# Patient Record
Sex: MALE | Race: WHITE | NOT HISPANIC OR LATINO | Employment: PART TIME | URBAN - METROPOLITAN AREA
[De-identification: names, ages, dates, MRNs, and addresses within clinical notes are randomized per-mention and may not be internally consistent; named-entity substitution may affect disease eponyms.]

---

## 2020-05-06 PROBLEM — E03.9 ACQUIRED HYPOTHYROIDISM: Status: ACTIVE | Noted: 2020-05-06

## 2020-05-06 PROBLEM — Z82.49 FAMILY HISTORY OF CORONARY ARTERY DISEASE: Status: ACTIVE | Noted: 2020-05-06

## 2020-05-22 ENCOUNTER — OFFICE VISIT (OUTPATIENT)
Dept: URGENT CARE | Facility: CLINIC | Age: 65
End: 2020-05-22
Payer: MEDICARE

## 2020-05-22 DIAGNOSIS — Z01.818 PREOP EXAMINATION: ICD-10-CM

## 2020-05-22 PROCEDURE — 99201 PR OFFICE/OUTPT VISIT,NEW,LEVL I: CPT | Mod: S$GLB,,, | Performed by: PHYSICIAN ASSISTANT

## 2020-05-22 PROCEDURE — 99201 PR OFFICE/OUTPT VISIT,NEW,LEVL I: ICD-10-PCS | Mod: S$GLB,,, | Performed by: PHYSICIAN ASSISTANT

## 2020-05-22 PROCEDURE — U0003 INFECTIOUS AGENT DETECTION BY NUCLEIC ACID (DNA OR RNA); SEVERE ACUTE RESPIRATORY SYNDROME CORONAVIRUS 2 (SARS-COV-2) (CORONAVIRUS DISEASE [COVID-19]), AMPLIFIED PROBE TECHNIQUE, MAKING USE OF HIGH THROUGHPUT TECHNOLOGIES AS DESCRIBED BY CMS-2020-01-R: HCPCS

## 2020-05-22 NOTE — PATIENT INSTRUCTIONS
Instructions for Patients Awaiting COVID-19 Test Results    You will either be called with your test result or it will be released to the patient portal.  If you have any questions about your test, please visit [email or web address] or call our COVID-19 information line at [phone].    Prevention steps for patients with confirmed or suspected COVID-19     Stay home except to get medical care.   Separate yourself from other people and animals in your home   Call ahead before visiting your doctor.   Wear a facemask.   Cover your coughs and sneezes.   Clean your hands often.   Avoid sharing personal household items.   Clean all high-touch surfaces every day.   Monitor your symptoms. Seek prompt medical attention if your illness is worsening (e.g., difficulty breathing). Before seeking care, call your healthcare provider.   If you have a medical emergency and need to call 911, notify the dispatch personnel that you have, or are being evaluated for COVID-19. If possible, put on a facemask before emergency medical services arrive.   Discontinuing home isolation. Call your provider about guidance to discontinue home isolation.    Recommended precautions for household members, intimate partners, and caregivers in a nonhealthcare setting of a patient with symptomatic laboratory-confirmed COVID-19 or a patient under investigation.  Household members, intimate partners, and caregivers in a nonhealthcare setting may have close contact with a person with symptomatic, laboratory-confirmed COVID-19 or a person under investigation. Close contacts should monitor their health; they should call their healthcare provider right away if they develop symptoms suggestive of COVID-19 (e.g., fever, cough, shortness of breath).    Close contacts should also follow these recommendations:   Make sure that you understand and can help the patient follow their healthcare provider's instructions for medication(s) and care.  You should help the patient with basic needs in the home and provide support for getting groceries, prescriptions, and other personal needs.   Monitor the patient's symptoms. If the patient is getting sicker, call his or her healthcare provider and tell them that the patient has laboratory-confirmed COVID-19. This will help the healthcare provider's office take steps to keep other people in the office or waiting room from getting infected. Ask the healthcare provider to call the local or [facility] health department for additional guidance. If the patient has a medical emergency and you need to call 911, notify the dispatch personnel that the patient has, or is being evaluated for COVID-19.   Household members should stay in another room or be  from the patient as much as possible. Household members should use a separate bedroom and bathroom, if available.   Prohibit visitors who do not have an essential need to be in the home.   Household members should care for any pets in the home. Do not handle pets or other animals while sick.   Make sure that shared spaces in the home have good air flow, such as by an air conditioner or an opened window, weather permitting.   Perform hand hygiene frequently. Wash your hands often with soap and water for at least 20 seconds or use an alcohol-based hand  that contains 60 to 95% alcohol, covering all surfaces of your hands and rubbing them together until they feel dry. Soap and water should be used preferentially if hands are visibly dirty.   Avoid touching your eyes, nose, and mouth with unwashed hands.   The patient should wear a facemask when you are around other people. If the patient is not able to wear a facemask (for example, because it causes trouble breathing), you, as the caregiver should wear a mask when you are in the same room as the patient.   Wear a disposable facemask and gloves when you touch or have contact with the patient's blood, stool,  or body fluids, such as saliva, sputum, nasal mucus, vomit, urine.  o Throw out disposable facemasks and gloves after using them. Do not reuse.  o When removing personal protective equipment, first remove and dispose of gloves. Then, immediately clean your hands with soap and water or alcohol-based hand . Next, remove and dispose of facemask, and immediately clean your hands again with soap and water or alcohol-based hand .   Avoid sharing household items with the patient. You should not share dishes, drinking glasses, cups, eating utensils, towels, bedding, or other items. After the patient uses these items, you should wash them thoroughly (see below Wash laundry thoroughly).   Clean all high-touch surfaces, such as counters, tabletops, doorknobs, bathroom fixtures, toilets, phones, keyboards, tablets, and bedside tables, every day. Also, clean any surfaces that may have blood, stool, or body fluids on them.   Use a household cleaning spray or wipe, according to the label instructions. Labels contain instructions for safe and effective use of the cleaning product including precautions you should take when applying the product, such as wearing gloves and making sure you have good ventilation during use of the product.   Wash laundry thoroughly.  o Immediately remove and wash clothes or bedding that have blood, stool, or body fluids on them.  o Wear disposable gloves while handling soiled items and keep soiled items away from your body. Clean your hands (with soap and water or an alcohol-based hand ) immediately after removing your gloves.  o Read and follow directions on labels of laundry or clothing items and detergent. In general, using a normal laundry detergent according to washing machine instructions and dry thoroughly using the warmest temperatures recommended on the clothing label.   Place all used disposable gloves, facemasks, and other contaminated items in a lined  container before disposing of them with other household waste. Clean your hands (with soap and water or an alcohol-based hand ) immediately after handling these items. Soap and water should be used preferentially if hands are visibly dirty.   Discuss any additional questions with your state or local health department or healthcare provider. Check available hours when contacting your local health department.    For more information see CDC link below.      https://www.cdc.gov/coronavirus/2019-ncov/hcp/guidance-prevent-spread.html#precautions        Sources:  CDC, [place] Department of Health and [facility]        If not allergic,take tylenol (acetominophen) for fever control, chills, or body aches every 4 hours. Do not exceed 4000 mg/ day.If not allergic, take Motrin (Ibuprofen) every 4 hours for fever, chills, pain or inflammation. Do not exceed 2400 mg/day. You can alternate taking tylenol and motrin.    If you were prescribed a narcotic medication, do not drive or operate heavy equipment or machinery while taking these medications.  You must understand that you've received an Urgent Care treatment only and that you may be released before all your medical problems are known or treated. You, the patient, will arrange for follow up care as instructed.  Follow up with your PCP or specialty clinic as directed in the next 1-2 weeks if not improved or as needed.  You can call [phone] to schedule an appointment with the appropriate provider.  If your condition worsens we recommend that you receive another evaluation at the emergency room immediately or contact your primary medical clinics after hours call service to discuss your concerns.    Please return here or go to the Emergency Department for any concerns or worsening of condition.    If you have been referred to another provider and wish to call to check on the status of your referral, please call [facility] at [phone]

## 2020-05-23 LAB — SARS-COV-2 RNA RESP QL NAA+PROBE: NOT DETECTED

## 2020-05-26 PROBLEM — R94.39 ABNORMAL NUCLEAR STRESS TEST: Status: ACTIVE | Noted: 2020-05-26

## 2020-07-13 PROBLEM — I25.10 CORONARY ARTERY DISEASE INVOLVING NATIVE CORONARY ARTERY OF NATIVE HEART WITHOUT ANGINA PECTORIS: Status: ACTIVE | Noted: 2020-07-13

## 2020-07-29 ENCOUNTER — OFFICE VISIT (OUTPATIENT)
Dept: NEUROSURGERY | Facility: CLINIC | Age: 65
End: 2020-07-29
Payer: MEDICARE

## 2020-07-29 VITALS
HEART RATE: 82 BPM | BODY MASS INDEX: 37.09 KG/M2 | WEIGHT: 236.31 LBS | SYSTOLIC BLOOD PRESSURE: 158 MMHG | HEIGHT: 67 IN | DIASTOLIC BLOOD PRESSURE: 88 MMHG

## 2020-07-29 DIAGNOSIS — G99.2 STENOSIS OF CERVICAL SPINE WITH MYELOPATHY: ICD-10-CM

## 2020-07-29 DIAGNOSIS — M54.16 LUMBAR RADICULOPATHY: Primary | ICD-10-CM

## 2020-07-29 DIAGNOSIS — M48.02 STENOSIS OF CERVICAL SPINE WITH MYELOPATHY: ICD-10-CM

## 2020-07-29 DIAGNOSIS — Z12.89 ENCOUNTER FOR SCREENING FOR MALIGNANT NEOPLASM OF OTHER SITES: ICD-10-CM

## 2020-07-29 DIAGNOSIS — M21.371 FOOT DROP, RIGHT: ICD-10-CM

## 2020-07-29 PROCEDURE — 99999 PR PBB SHADOW E&M-EST. PATIENT-LVL III: CPT | Mod: PBBFAC,,, | Performed by: STUDENT IN AN ORGANIZED HEALTH CARE EDUCATION/TRAINING PROGRAM

## 2020-07-29 PROCEDURE — 99205 OFFICE O/P NEW HI 60 MIN: CPT | Mod: S$GLB,ICN,, | Performed by: STUDENT IN AN ORGANIZED HEALTH CARE EDUCATION/TRAINING PROGRAM

## 2020-07-29 PROCEDURE — 3008F BODY MASS INDEX DOCD: CPT | Mod: CPTII,S$GLB,ICN, | Performed by: STUDENT IN AN ORGANIZED HEALTH CARE EDUCATION/TRAINING PROGRAM

## 2020-07-29 PROCEDURE — 99205 PR OFFICE/OUTPT VISIT, NEW, LEVL V, 60-74 MIN: ICD-10-PCS | Mod: S$GLB,ICN,, | Performed by: STUDENT IN AN ORGANIZED HEALTH CARE EDUCATION/TRAINING PROGRAM

## 2020-07-29 PROCEDURE — 1101F PT FALLS ASSESS-DOCD LE1/YR: CPT | Mod: CPTII,S$GLB,ICN, | Performed by: STUDENT IN AN ORGANIZED HEALTH CARE EDUCATION/TRAINING PROGRAM

## 2020-07-29 PROCEDURE — 99999 PR PBB SHADOW E&M-EST. PATIENT-LVL III: ICD-10-PCS | Mod: PBBFAC,,, | Performed by: STUDENT IN AN ORGANIZED HEALTH CARE EDUCATION/TRAINING PROGRAM

## 2020-07-29 PROCEDURE — 1101F PR PT FALLS ASSESS DOC 0-1 FALLS W/OUT INJ PAST YR: ICD-10-PCS | Mod: CPTII,S$GLB,ICN, | Performed by: STUDENT IN AN ORGANIZED HEALTH CARE EDUCATION/TRAINING PROGRAM

## 2020-07-29 PROCEDURE — 3008F PR BODY MASS INDEX (BMI) DOCUMENTED: ICD-10-PCS | Mod: CPTII,S$GLB,ICN, | Performed by: STUDENT IN AN ORGANIZED HEALTH CARE EDUCATION/TRAINING PROGRAM
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
## 2020-07-29 NOTE — LETTER
July 29, 2020      Tori Escalona MD  [address] [address] [facility] [address]           [facility] Neurosurgery  [address] [place] [address]  Phone: [phone]  Fax: [phone]          Patient: Ap Chi   MR Number: [id number]   YOB: 1955   Date of Visit: 7/29/2020       Dear Dr. Tori Escalona:    Thank you for referring Ap Chi to me for evaluation. Attached you will find relevant portions of my assessment and plan of care.    If you have questions, please do not hesitate to call me. I look forward to following Ap Chi along with you.    Sincerely,    Hussain Willis MD    Enclosure  CC:  No Recipients    If you would like to receive this communication electronically, please contact [email or web address] or [phone] to request more information on [employer] access.    For providers and/or their staff who would like to refer a patient to [facility], please contact us through our one-stop-shop provider referral line, [facility] [place], at [phone].    If you feel you have received this communication in error or would no longer like to receive these types of communications, please e-mail [email or web address]

## 2020-07-31 ENCOUNTER — TELEPHONE (OUTPATIENT)
Dept: NEUROSURGERY | Facility: CLINIC | Age: 65
End: 2020-07-31

## 2020-07-31 DIAGNOSIS — Z13.9 SCREENING PROCEDURE: ICD-10-CM

## 2020-07-31 NOTE — TELEPHONE ENCOUNTER
Please set the patient up for a midline epidural steroid injection interlaminar at L4/5 to cover the L4/5 and L3/4 levels.

## 2020-07-31 NOTE — TELEPHONE ENCOUNTER
Patient was seen by Dr Willis. He recommends L3-4 L4-5 ANN-MARIE. He would also like him scheduled for a consult. Please contact patient to schedule.

## 2020-08-07 NOTE — TELEPHONE ENCOUNTER
Spoke with patient about scheduling procedure. He is prescribed aspirin by Dr. Mcnair. We will obtain this clearance and contact patient to scheduling once we have this.

## 2020-08-11 ENCOUNTER — HOSPITAL ENCOUNTER (OUTPATIENT)
Dept: RADIOLOGY | Facility: HOSPITAL | Age: 65
Discharge: HOME | End: 2020-08-11
Attending: STUDENT IN AN ORGANIZED HEALTH CARE EDUCATION/TRAINING PROGRAM
Payer: MEDICARE

## 2020-08-11 DIAGNOSIS — M54.16 LUMBAR RADICULOPATHY: ICD-10-CM

## 2020-08-11 DIAGNOSIS — Z12.89 ENCOUNTER FOR SCREENING FOR MALIGNANT NEOPLASM OF OTHER SITES: ICD-10-CM

## 2020-08-11 DIAGNOSIS — M21.371 FOOT DROP, RIGHT: ICD-10-CM

## 2020-08-11 PROCEDURE — 72146 MRI CHEST SPINE W/O DYE: CPT | Mod: TC,PO

## 2020-08-11 PROCEDURE — 72120 X-RAY BEND ONLY L-S SPINE: CPT | Mod: TC,FY,PO

## 2020-08-11 PROCEDURE — 72120 X-RAY BEND ONLY L-S SPINE: CPT | Mod: 26,,, | Performed by: RADIOLOGY

## 2020-08-11 PROCEDURE — 72146 MRI CHEST SPINE W/O DYE: CPT | Mod: 26,,, | Performed by: RADIOLOGY

## 2020-08-11 PROCEDURE — 72141 MRI NECK SPINE W/O DYE: CPT | Mod: TC,PO

## 2020-08-11 PROCEDURE — 70551 MRI BRAIN STEM W/O DYE: CPT | Mod: 26,,, | Performed by: RADIOLOGY

## 2020-08-11 PROCEDURE — 72131 CT LUMBAR SPINE W/O DYE: CPT | Mod: 26,,, | Performed by: RADIOLOGY

## 2020-08-11 PROCEDURE — 72100 XR LUMBAR SPINE AP AND LAT WITH FLEX/EXT: ICD-10-PCS | Mod: 26,,, | Performed by: RADIOLOGY

## 2020-08-11 PROCEDURE — 72131 CT LUMBAR SPINE WITHOUT CONTRAST: ICD-10-PCS | Mod: 26,,, | Performed by: RADIOLOGY

## 2020-08-11 PROCEDURE — 72141 MRI CERVICAL SPINE WITHOUT CONTRAST: ICD-10-PCS | Mod: 26,,, | Performed by: RADIOLOGY

## 2020-08-11 PROCEDURE — 70551 MRI BRAIN STEM W/O DYE: CPT | Mod: TC,PO

## 2020-08-11 PROCEDURE — 72146 MRI THORACIC SPINE WITHOUT CONTRAST: ICD-10-PCS | Mod: 26,,, | Performed by: RADIOLOGY

## 2020-08-11 PROCEDURE — 72100 X-RAY EXAM L-S SPINE 2/3 VWS: CPT | Mod: 26,,, | Performed by: RADIOLOGY

## 2020-08-11 PROCEDURE — 72120 XR LUMBAR SPINE AP AND LAT WITH FLEX/EXT: ICD-10-PCS | Mod: 26,,, | Performed by: RADIOLOGY

## 2020-08-11 PROCEDURE — 72131 CT LUMBAR SPINE W/O DYE: CPT | Mod: TC,PO

## 2020-08-11 PROCEDURE — 70551 MRI BRAIN WITHOUT CONTRAST: ICD-10-PCS | Mod: 26,,, | Performed by: RADIOLOGY

## 2020-08-11 PROCEDURE — 72141 MRI NECK SPINE W/O DYE: CPT | Mod: 26,,, | Performed by: RADIOLOGY

## 2020-08-12 ENCOUNTER — CLINICAL SUPPORT (OUTPATIENT)
Dept: REHABILITATION | Facility: HOSPITAL | Age: 65
End: 2020-08-12
Attending: STUDENT IN AN ORGANIZED HEALTH CARE EDUCATION/TRAINING PROGRAM
Payer: MEDICARE

## 2020-08-12 DIAGNOSIS — R29.898 WEAKNESS OF RIGHT LOWER EXTREMITY: ICD-10-CM

## 2020-08-12 DIAGNOSIS — M21.371 FOOT DROP, RIGHT: ICD-10-CM

## 2020-08-12 DIAGNOSIS — M54.16 LUMBAR RADICULOPATHY: ICD-10-CM

## 2020-08-12 DIAGNOSIS — M54.41 CHRONIC RIGHT-SIDED LOW BACK PAIN WITH RIGHT-SIDED SCIATICA: ICD-10-CM

## 2020-08-12 DIAGNOSIS — R26.2 DIFFICULTY WALKING: ICD-10-CM

## 2020-08-12 DIAGNOSIS — Z12.89 ENCOUNTER FOR SCREENING FOR MALIGNANT NEOPLASM OF OTHER SITES: ICD-10-CM

## 2020-08-12 DIAGNOSIS — G89.29 CHRONIC RIGHT-SIDED LOW BACK PAIN WITH RIGHT-SIDED SCIATICA: ICD-10-CM

## 2020-08-12 PROCEDURE — 97161 PT EVAL LOW COMPLEX 20 MIN: CPT | Mod: PO | Performed by: PHYSICAL THERAPIST

## 2020-08-12 PROCEDURE — 97110 THERAPEUTIC EXERCISES: CPT | Mod: PO | Performed by: PHYSICAL THERAPIST
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
## 2020-08-20 ENCOUNTER — OFFICE VISIT (OUTPATIENT)
Dept: PHYSICAL MEDICINE AND REHAB | Facility: CLINIC | Age: 65
End: 2020-08-20
Payer: MEDICARE

## 2020-08-20 VITALS — HEIGHT: 67 IN | WEIGHT: 236.31 LBS | BODY MASS INDEX: 37.09 KG/M2

## 2020-08-20 DIAGNOSIS — M54.16 LUMBAR RADICULITIS: ICD-10-CM

## 2020-08-20 DIAGNOSIS — M79.605 PAIN IN BOTH LOWER EXTREMITIES: ICD-10-CM

## 2020-08-20 DIAGNOSIS — M79.604 PAIN IN BOTH LOWER EXTREMITIES: ICD-10-CM

## 2020-08-20 DIAGNOSIS — R29.898 LEG WEAKNESS, BILATERAL: ICD-10-CM

## 2020-08-20 PROCEDURE — 95886 MUSC TEST DONE W/N TEST COMP: CPT | Mod: S$GLB,,, | Performed by: PHYSICAL MEDICINE & REHABILITATION

## 2020-08-20 PROCEDURE — 95886 PR EMG COMPLETE, W/ NERVE CONDUCTION STUDIES, 5+ MUSCLES: ICD-10-PCS | Mod: S$GLB,,, | Performed by: PHYSICAL MEDICINE & REHABILITATION

## 2020-08-20 PROCEDURE — 99499 UNLISTED E&M SERVICE: CPT | Mod: GC,S$GLB,, | Performed by: PHYSICAL MEDICINE & REHABILITATION

## 2020-08-20 PROCEDURE — 95909 PR NERVE CONDUCTION STUDY; 5-6 STUDIES: ICD-10-PCS | Mod: S$GLB,,, | Performed by: PHYSICAL MEDICINE & REHABILITATION

## 2020-08-20 PROCEDURE — 95909 NRV CNDJ TST 5-6 STUDIES: CPT | Mod: S$GLB,,, | Performed by: PHYSICAL MEDICINE & REHABILITATION

## 2020-08-20 PROCEDURE — 99499 NO LOS: ICD-10-PCS | Mod: GC,S$GLB,, | Performed by: PHYSICAL MEDICINE & REHABILITATION

## 2020-08-20 PROCEDURE — 99999 PR PBB SHADOW E&M-EST. PATIENT-LVL III: CPT | Mod: PBBFAC,,, | Performed by: PHYSICAL MEDICINE & REHABILITATION

## 2020-08-20 PROCEDURE — 99999 PR PBB SHADOW E&M-EST. PATIENT-LVL III: ICD-10-PCS | Mod: PBBFAC,,, | Performed by: PHYSICAL MEDICINE & REHABILITATION

## 2020-08-20 NOTE — LETTER
August 20, 2020      Hussain Willis MD  [address] [facility] [address] [place] [address]           [place] [facility]  [address] [place] [address]  Phone: [phone]  Fax: [phone]          Patient: Ap Chi   MR Number: [id number]   YOB: 1955   Date of Visit: 8/20/2020       Dear Dr. Hussain Willis:    Thank you for referring Ap Chi to me for evaluation. Attached you will find relevant portions of my assessment and plan of care.    If you have questions, please do not hesitate to call me. I look forward to following Ap Chi along with you.    Sincerely,    Gamal Nair MD    Enclosure  CC:  No Recipients    If you would like to receive this communication electronically, please contact [email or web address] or [phone] to request more information on [employer] access.    For providers and/or their staff who would like to refer a patient to [facility], please contact us through our one-stop-shop provider referral line, [facility], at [phone].    If you feel you have received this communication in error or would no longer like to receive these types of communications, please e-mail [email or web address]

## 2020-08-20 NOTE — PROGRESS NOTES
[facility]  [address]  [place] [address]             Full Name: Ap Chi Gender: Male  Patient ID: [id number] YOB: 1955  History: 65 y.o. male reports low back pain with paresthesias, pain and weakness in bilateral legs.      Visit Date: 8/19/2020 10:51  Age: 65 Years 3 Months Old      Sensory NCS      Nerve / Sites Rec. Site Onset Lat Peak Lat NP Amp PP Amp Segments Distance Velocity     ms ms µV µV  cm m/s   L Sural - Ankle (Calf)      Calf Ankle 3.23 3.70 9.3 2.9 Calf - Ankle 14 43      2 Ankle 3.28 3.70 8.2 3.4      R Sural - Ankle (Calf)      Calf Ankle 3.33 3.75 6.3 0.00 Calf - Ankle 14 42      2 Ankle 3.39 3.85 6.4 2.5          Motor NCS      Nerve / Sites Muscle Latency Amplitude Amp % Duration Segments Distance Lat Diff Velocity     ms mV % ms  cm ms m/s   L Peroneal - EDB      Ankle EDB 7.97 0.5 100 6.51 Ankle - EDB 8        Fib head EDB 14.27 1.6 331 6.56 Fib head - Ankle 28 6.30 44   R Peroneal - EDB      Ankle EDB 8.39 0.4 100 5.42 Ankle - EDB 8        Fib head EDB 17.24 0.4 93.5 5.05 Fib head - Ankle 27 8.85 30   R Tibial - AH      Ankle AH 5.52 4.4 100 5.99 Ankle - AH 8        Pop fossa AH 13.59 4.0 92.1  Pop fossa - Ankle 39 8.07 48       EMG         EMG Summary Table     Spontaneous MUAP Recruitment   Muscle IA Fib PSW Fasc H.F. Amp Dur. PPP Pattern   L. Deltoid N None None None None N N N N   R. Vastus medialis N None None None None N N N N   R. Tibialis anterior N None None None None N N N N   R. Peroneus longus N None None None None N N N N   R. Gastrocnemius (Medial head) N None None None None N N N N   R. Gluteus medius N None None None None N N N N   R. Gluteus david N None None None None       R. Lumbar paraspinals N None None None None N N N N   L. Vastus medialis N None None None None N N N N   L. Tibialis anterior N None None None None N N N N   L. Peroneus longus N None None None None N N N N   L. Gastrocnemius (Medial head) N None None None None  N N N N   L. Lumbar paraspinals 1+ None 1+ None None       L. Gluteus david N None None None None           Summary    The motor conduction test was performed on 3 nerve(s). The results were normal in 1 nerve(s): R Tibial - AH. Results outside the specified normal range were found in 2 nerve(s), as follows:   In the L Peroneal - EDB study  o the take off latency result was increased for Ankle stimulation  o the peak amplitude result was reduced for Ankle stimulation   In the R Peroneal - EDB study  o the take off latency result was increased for Ankle stimulation  o the peak amplitude result was reduced for Ankle stimulation  o the take off velocity result was reduced for Fib head - Ankle segment    The sensory conduction test was normal in all 2 of the tested nerves: L Sural - Ankle (Calf), R Sural - Ankle (Calf).    The needle EMG examination was performed in 14 muscles. It was normal in 13 muscle(s): L. Deltoid, R. Vastus medialis, R. Tibialis anterior, R. Peroneus longus, R. Gastrocnemius (Medial head), R. Gluteus medius, R. Gluteus david, R. Lumbar paraspinals, L. Vastus medialis, L. Tibialis anterior, L. Peroneus longus, L. Gastrocnemius (Medial head), L. Gluteus david. The study was abnormal in 1 muscle(s), with the following distribution:   The L. Lumbar paraspinals had abnormal spontaneous activity.      Electrodiagnostic Impression:  1. Abnormalities on nerve conduction studies were isolated to prolonged latencies and reduced amplitudes of the bilateral peroneal motor studies recording at the extensor digitorum brevis muscles.  Of note, no visible contraction of the EDB muscle was observed, indicative of atrophy.  Atrophy of this muscle could be secondary to L5 radiculopathy, peroneal nerve neuropathy, peripheral polyneuropathy, or disuse/musculoskeletal causes.  Todays nerve conduction studies do not support a diagnosis of peripheral polyneuropathy.  Furthermore, needle EMG examination of the  bilateral lower extremities failed to show any abnormal findings in muscles supplied by the L5 myotome or peroneal nerves.  As such, this finding may be most likely due to disuse/musculoskeletal causes, however a history of (but not active) L5 radiculopathy cannot be completely excluded.    2. Abnormal needle EMG findings were isolated to sampling of the left lumbar paraspinal musculature in the form of very brief abnormal spontaneous activity.  This finding, in isolation, is somewhat nonspecific.  This could be indicative of early radiculopathy, or lumbosacral radiculitis.  Clinical correlation required.    Summary:  1. Bilateral extensor digitorum brevis muscle wasting, see above.  2. Mild abnormal needle EMG findings isolated to the left lumbar paraspinal musculature, see above.    Plan:  Today's test results will be sent to his referring physician, Dr. Willis, for further review and direction in his treatment.    Thank you very much for the referral. Please call if you have any questions regarding this study or the report.       -------------------------------  Gamal Nair M.D.

## 2020-08-21 ENCOUNTER — CLINICAL SUPPORT (OUTPATIENT)
Dept: REHABILITATION | Facility: HOSPITAL | Age: 65
End: 2020-08-21
Attending: STUDENT IN AN ORGANIZED HEALTH CARE EDUCATION/TRAINING PROGRAM
Payer: MEDICARE

## 2020-08-21 DIAGNOSIS — M54.41 CHRONIC RIGHT-SIDED LOW BACK PAIN WITH RIGHT-SIDED SCIATICA: ICD-10-CM

## 2020-08-21 DIAGNOSIS — G89.29 CHRONIC RIGHT-SIDED LOW BACK PAIN WITH RIGHT-SIDED SCIATICA: ICD-10-CM

## 2020-08-21 DIAGNOSIS — R29.898 WEAKNESS OF RIGHT LOWER EXTREMITY: ICD-10-CM

## 2020-08-21 DIAGNOSIS — R26.2 DIFFICULTY WALKING: ICD-10-CM

## 2020-08-21 PROCEDURE — 97110 THERAPEUTIC EXERCISES: CPT | Mod: PO | Performed by: PHYSICAL THERAPIST

## 2020-08-21 NOTE — PATIENT INSTRUCTIONS
HAMSTRING STRETCH WITH TOWEL    While lying down on your back, hook a towel or strap under  your foot and draw up your leg until a stretch is felt along the backside of your leg. Hold 30 seconds, repeat 30 seconds ea.    Keep your knee in a straightened position during the stretch.

## 2020-08-21 NOTE — PROGRESS NOTES
[facility]      Name: Ap Chi  Clinic Number: [id number]    Therapy Diagnosis:   Encounter Diagnoses   Name Primary?    Chronic right-sided low back pain with right-sided sciatica     Weakness of right lower extremity     Difficulty walking      Physician: Hussain Willis MD    Visit Date: 2020    Physician Orders: PT Eval and Treat Healthy Back program  Medical Diagnosis from Referral: lumbar radiculopathy; right drop foot  Evaluation Date: 2020  Authorization Period Expiration: 20  Plan of Care Expiration: 20  Reassessment Due: 10th visit  Visit # / Visits authorized:       Time In: 9:05  Time Out: 9:55AM  Total Billable Time: 50 minutes  Precautions: Standard       Pattern of pain determined: Pattern 2       Subjective   Ap reports no change in sx. Had not started on HEP.  Did a lot in pool yesterday which seems to help. Remaining active helps.    Patient reports tolerating previous visit without adverse effect  Patient reports their pain to be 0/10 on a 0-10 scale with 0 being no pain and 10 being the worst pain imaginable.  Pain Location: across low back, right posterior thigh and calf, occasional right ant thigh         Work and leisure: Occupation: construction work,   Leisure: work around house, currently building deck        Pt goals: strengthen legs and back, stay mobile       Objective     Baseline Isometric Testing on Med X equipment: Testing administered by PT  Date of testin20  ROM 3-42 deg   Max Peak Torque 139    Min Peak Torque 74    Flex/Ext Ratio 1.9:1   % below normative data 52         Outcomes:  Initial score: 46%   Visit 5 score:  Goal:      Treatment    Pt was instructed in and performed the following:     Ap received therapeutic exercises to develop/improved posture, cardiovascular endurance, muscular endurance, lumbar/cervical ROM, strength and muscular endurance for 50 minutes including the  following exercises:      Med x dynamic exercise   Single KTC 5x 5 sec  KTC 5x 5 sec  Post pelvic tilt x20  Hamstring stretch 3x 30 sec ea  Seated trunk flexion 5x 5 sec      Peripheral muscle strengthening which included 1 set of 15-20 repetitions at a slow, controlled 10-13 second per rep pace focused on strengthening supporting musculature for improved body mechanics and functional mobility.  Pt and therapist focused on proper form during treatment to ensure optimal strengthening of each targeted muscle group.  Machines were utilized including torso rotation, leg extension, leg curl, chest press, upright row. Tricep extension, bicep curl, leg press, and hip abduction added visit 3  [facility] 8/21/2020   Visit Number 2   VAS Pain Rating 0   Recumbent Bike Seat Pos. 8   Time 10   Flexion in Lying 10   Flexion in Sitting 5   Lumbar Extension Seat Pad -   Femur Restraint -   Top Dead Center -   Counterweight -   Lumbar Flexion -   Lumbar Extension -   Lumbar Peak Torque -   Min Torque -   Test Percent Below Normative Data -   Lumbar Weight 70   Repetitions 15   Rating of Perceived Exertion 3   Ice - Z Lie (in min.) -           Home Exercises Provided and Patient Education Provided     Education provided:   - instructed in HEP, use of ice if pain flares up and use of lumbar support    Written Home Exercises Provided: yes.  Exercises were reviewed and Alberto Chi was able to demonstrate them prior to the end of the session.  Alberto Chi demonstrated good  understanding of the education provided.     See EMR under Patient Instructions for exercises provided 8/21/2020.          Assessment     Initiated strengthening on MEDX and LE peripheral machines. Level of perceived exertion higher on LE machines secondary to right LE weakness, but no significant difficulty with left LE. Also instructed in Pattern 2 flexion exercises and hamstring stretching.    Patient is making good progress towards established  goals.  Pt will continue to benefit from skilled outpatient physical therapy to address the deficits stated in the impairment chart, provide pt/family education and to maximize pt's level of independence in the home and community environment.     Anticipated Barriers for therapy: none  Pt's spiritual, cultural and educational needs considered and pt agreeable to plan of care and goals as stated below:             Goals:   Short term goals:  6 weeks or 10 visits   1.  Pt will demonstrate increased lumbar ROM by at least 3 degrees from the initial ROM value with improvements noted in functional ROM and ability to perform ADLs.  2.  Pt will demonstrate increased MedX average isometric strength value  by 20% from initial test resulting in improved ability to perform bending, lifting, and carrying activities safely, confidently.     3.  Patient report a reduction in worst pain score by 1-2 points for improved tolerance for rising from sitting and getting out of bed.  4.  Pt able to perform HEP correctly with minimal cueing or supervision from therapist to encourage independent management of symptoms.         Long term goals: 10 weeks or 20 visits   1. Pt will demonstrate increased lumbar ROM by at least 6 degrees from initial ROM value, resulting in improved ability to perform functional fwd bending while standing and sitting.   2. Pt will demonstrate increased MedX average isometric strength value  by 30% from initial test resulting in improved ability to perform bending, lifting, and carrying activities safely, confidently.  3. Pt to demonstrate ability to independently control and reduce their pain through posture positioning and mechanical movements throughout a typical day.  4.  Pt will demonstrate reduced pain and improved functional outcomes as reported on the FOTO by reaching a limitation score of < or = 35% or less in order to demonstrate subjective improvement in pt's condition.    5. Pt will demonstrate  independence with the HEP at discharge  6.  Pt will demonstrate improved LE and core strength and flexibility in order to keep mobile (patient goal)        Plan   Continue with established Plan of Care towards established PT goals.

## 2020-08-24 PROBLEM — M48.061 DEGENERATIVE LUMBAR SPINAL STENOSIS: Status: ACTIVE | Noted: 2020-08-24

## 2020-08-24 PROBLEM — M48.02 DEGENERATIVE CERVICAL SPINAL STENOSIS: Status: ACTIVE | Noted: 2020-08-24

## 2020-08-26 ENCOUNTER — CLINICAL SUPPORT (OUTPATIENT)
Dept: REHABILITATION | Facility: HOSPITAL | Age: 65
End: 2020-08-26
Attending: STUDENT IN AN ORGANIZED HEALTH CARE EDUCATION/TRAINING PROGRAM
Payer: MEDICARE

## 2020-08-26 DIAGNOSIS — R26.2 DIFFICULTY WALKING: ICD-10-CM

## 2020-08-26 DIAGNOSIS — G89.29 CHRONIC RIGHT-SIDED LOW BACK PAIN WITH RIGHT-SIDED SCIATICA: ICD-10-CM

## 2020-08-26 DIAGNOSIS — R29.898 WEAKNESS OF RIGHT LOWER EXTREMITY: ICD-10-CM

## 2020-08-26 DIAGNOSIS — M48.02 STENOSIS OF CERVICAL SPINE WITH MYELOPATHY: Primary | ICD-10-CM

## 2020-08-26 DIAGNOSIS — G99.2 STENOSIS OF CERVICAL SPINE WITH MYELOPATHY: Primary | ICD-10-CM

## 2020-08-26 DIAGNOSIS — M54.41 CHRONIC RIGHT-SIDED LOW BACK PAIN WITH RIGHT-SIDED SCIATICA: ICD-10-CM

## 2020-08-26 PROCEDURE — 97110 THERAPEUTIC EXERCISES: CPT | Mod: PO | Performed by: PHYSICAL THERAPIST

## 2020-08-26 NOTE — PROGRESS NOTES
[facility]      Name: Ap Chi  Clinic Number: [id number]    Therapy Diagnosis:   Encounter Diagnoses   Name Primary?    Chronic right-sided low back pain with right-sided sciatica     Weakness of right lower extremity     Difficulty walking      Physician: Hussain Willis MD    Visit Date: 2020    Physician Orders: PT Eval and Treat Healthy Back program  Medical Diagnosis from Referral: lumbar radiculopathy; right drop foot  Evaluation Date: 2020  Authorization Period Expiration: 20  Plan of Care Expiration: 20  Reassessment Due: 10th visit  Visit # / Visits authorized: 3/ 6      Time In: 9:05AM  Time Out: 9:55AM  Total Billable Time: 50 minutes  Precautions: Standard       Pattern of pain determined: Pattern 2       Subjective   Ap reports he stopped Gabapenten on Monday because of foggy-headedness and switched to Celebrex.Back pain on awakening was 3/10. It had been 6/10. He feels that has helped. After last visit he had increased cervical pain which he relates to torso rotation exercise. Right knee also hurt which he relates to hamstring stretch.    Patient reports tolerating previous visit without adverse effect  Patient reports their pain to be 3/10 on a 0-10 scale with 0 being no pain and 10 being the worst pain imaginable.  Pain Location: across low back, right posterior thigh and calf, occasional right ant thigh         Work and leisure: Occupation: construction work,   Leisure: work around house, currently building deck        Pt goals: strengthen legs and back, stay mobile       Objective     Baseline Isometric Testing on Med X equipment: Testing administered by PT  Date of testin20  ROM 3-42 deg   Max Peak Torque 139    Min Peak Torque 74    Flex/Ext Ratio 1.9:1   % below normative data 52         Outcomes:  Initial score: 46%   Visit 5 score:  Goal:      Treatment    Pt was instructed in and performed the following:      Ap received therapeutic exercises to develop/improved posture, cardiovascular endurance, muscular endurance, lumbar/cervical ROM, strength and muscular endurance for 50 minutes including the following exercises:      Med x dynamic exercise   Single KTC 5x 5 sec  KTC 5x 5 sec  Post pelvic tilt x20  Hamstring stretch 3x 30 sec ea seated  Seated trunk flexion 5x 5 sec      Peripheral muscle strengthening which included 1 set of 15-20 repetitions at a slow, controlled 10-13 second per rep pace focused on strengthening supporting musculature for improved body mechanics and functional mobility.  Pt and therapist focused on proper form during treatment to ensure optimal strengthening of each targeted muscle group.  Machines were utilized including torso rotation, leg extension, leg curl, chest press, upright row. Tricep extension, bicep curl, leg press, and hip abduction added visit 3  [facility] 8/26/2020   Visit Number 3   VAS Pain Rating 3   Recumbent Bike Seat Pos. 0   Time 10   Flexion in Lying 10   Flexion in Sitting 5   Lumbar Extension Seat Pad -   Femur Restraint -   Top Dead Center -   Counterweight -   Lumbar Flexion -   Lumbar Extension -   Lumbar Peak Torque -   Min Torque -   Test Percent Below Normative Data -   Lumbar Weight 70   Repetitions 20   Rating of Perceived Exertion 3   Ice - Z Lie (in min.) -           Home Exercises Provided and Patient Education Provided     Education provided:   - instructed in HEP, use of ice if pain flares up and use of lumbar support    Written Home Exercises Provided: yes.  Exercises were reviewed and Alberto was able to demonstrate them prior to the end of the session.  Alberto demonstrated good  understanding of the education provided.     See EMR under Patient Instructions for exercises provided 8/21/2020.          Assessment     Increased cervical pain following last visit. Had not initiated any UE exercise as of that visit. Had some pain with torso  rotation so held on that today. Plan to resume with decreased resistance and decreased ROM next visit. Will also decrease resistance with leg press secondary to right medial knee pain.    Patient is making good progress towards established goals.  Pt will continue to benefit from skilled outpatient physical therapy to address the deficits stated in the impairment chart, provide pt/family education and to maximize pt's level of independence in the home and community environment.     Anticipated Barriers for therapy: none  Pt's spiritual, cultural and educational needs considered and pt agreeable to plan of care and goals as stated below:             Goals:   Short term goals:  6 weeks or 10 visits   1.  Pt will demonstrate increased lumbar ROM by at least 3 degrees from the initial ROM value with improvements noted in functional ROM and ability to perform ADLs.  2.  Pt will demonstrate increased MedX average isometric strength value  by 20% from initial test resulting in improved ability to perform bending, lifting, and carrying activities safely, confidently.     3.  Patient report a reduction in worst pain score by 1-2 points for improved tolerance for rising from sitting and getting out of bed.  4.  Pt able to perform HEP correctly with minimal cueing or supervision from therapist to encourage independent management of symptoms.         Long term goals: 10 weeks or 20 visits   1. Pt will demonstrate increased lumbar ROM by at least 6 degrees from initial ROM value, resulting in improved ability to perform functional fwd bending while standing and sitting.   2. Pt will demonstrate increased MedX average isometric strength value  by 30% from initial test resulting in improved ability to perform bending, lifting, and carrying activities safely, confidently.  3. Pt to demonstrate ability to independently control and reduce their pain through posture positioning and mechanical movements throughout a typical day.  4.   Pt will demonstrate reduced pain and improved functional outcomes as reported on the FOTO by reaching a limitation score of < or = 35% or less in order to demonstrate subjective improvement in pt's condition.    5. Pt will demonstrate independence with the HEP at discharge  6.  Pt will demonstrate improved LE and core strength and flexibility in order to keep mobile (patient goal)        Plan   Continue with established Plan of Care towards established PT goals.

## 2020-08-27 ENCOUNTER — OFFICE VISIT (OUTPATIENT)
Dept: NEUROSURGERY | Facility: CLINIC | Age: 65
End: 2020-08-27
Payer: MEDICARE

## 2020-08-27 ENCOUNTER — HOSPITAL ENCOUNTER (OUTPATIENT)
Dept: RADIOLOGY | Facility: HOSPITAL | Age: 65
Discharge: HOME | End: 2020-08-27
Attending: STUDENT IN AN ORGANIZED HEALTH CARE EDUCATION/TRAINING PROGRAM
Payer: MEDICARE

## 2020-08-27 VITALS
SYSTOLIC BLOOD PRESSURE: 133 MMHG | WEIGHT: 235.69 LBS | BODY MASS INDEX: 36.99 KG/M2 | DIASTOLIC BLOOD PRESSURE: 79 MMHG | TEMPERATURE: 97 F | HEIGHT: 67 IN | HEART RATE: 82 BPM

## 2020-08-27 DIAGNOSIS — G99.2 STENOSIS OF CERVICAL SPINE WITH MYELOPATHY: ICD-10-CM

## 2020-08-27 DIAGNOSIS — G95.89 MYELOMALACIA OF CERVICAL CORD: Primary | ICD-10-CM

## 2020-08-27 DIAGNOSIS — M48.02 STENOSIS OF CERVICAL SPINE WITH MYELOPATHY: ICD-10-CM

## 2020-08-27 PROCEDURE — 1101F PR PT FALLS ASSESS DOC 0-1 FALLS W/OUT INJ PAST YR: ICD-10-PCS | Mod: CPTII,S$GLB,, | Performed by: STUDENT IN AN ORGANIZED HEALTH CARE EDUCATION/TRAINING PROGRAM

## 2020-08-27 PROCEDURE — 3008F BODY MASS INDEX DOCD: CPT | Mod: CPTII,S$GLB,, | Performed by: STUDENT IN AN ORGANIZED HEALTH CARE EDUCATION/TRAINING PROGRAM

## 2020-08-27 PROCEDURE — 72050 X-RAY EXAM NECK SPINE 4/5VWS: CPT | Mod: TC,FY,PO

## 2020-08-27 PROCEDURE — 72050 X-RAY EXAM NECK SPINE 4/5VWS: CPT | Mod: 26,,, | Performed by: RADIOLOGY

## 2020-08-27 PROCEDURE — 99999 PR PBB SHADOW E&M-EST. PATIENT-LVL III: CPT | Mod: PBBFAC,,, | Performed by: STUDENT IN AN ORGANIZED HEALTH CARE EDUCATION/TRAINING PROGRAM

## 2020-08-27 PROCEDURE — 99215 PR OFFICE/OUTPT VISIT, EST, LEVL V, 40-54 MIN: ICD-10-PCS | Mod: S$GLB,,, | Performed by: STUDENT IN AN ORGANIZED HEALTH CARE EDUCATION/TRAINING PROGRAM

## 2020-08-27 PROCEDURE — 72050 XR CERVICAL SPINE AP LAT WITH FLEX EXTEN: ICD-10-PCS | Mod: 26,,, | Performed by: RADIOLOGY

## 2020-08-27 PROCEDURE — 99999 PR PBB SHADOW E&M-EST. PATIENT-LVL III: ICD-10-PCS | Mod: PBBFAC,,, | Performed by: STUDENT IN AN ORGANIZED HEALTH CARE EDUCATION/TRAINING PROGRAM

## 2020-08-27 PROCEDURE — 99215 OFFICE O/P EST HI 40 MIN: CPT | Mod: S$GLB,,, | Performed by: STUDENT IN AN ORGANIZED HEALTH CARE EDUCATION/TRAINING PROGRAM

## 2020-08-27 PROCEDURE — 1101F PT FALLS ASSESS-DOCD LE1/YR: CPT | Mod: CPTII,S$GLB,, | Performed by: STUDENT IN AN ORGANIZED HEALTH CARE EDUCATION/TRAINING PROGRAM

## 2020-08-27 PROCEDURE — 3008F PR BODY MASS INDEX (BMI) DOCUMENTED: ICD-10-PCS | Mod: CPTII,S$GLB,, | Performed by: STUDENT IN AN ORGANIZED HEALTH CARE EDUCATION/TRAINING PROGRAM
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
## 2020-08-31 ENCOUNTER — TELEPHONE (OUTPATIENT)
Dept: PAIN MEDICINE | Facility: CLINIC | Age: 65
End: 2020-08-31

## 2020-08-31 NOTE — TELEPHONE ENCOUNTER
Left patient a voicemail to call back and schedule the lumbar injection. Cardio clearance was received by Dr. Mcnair.

## 2020-09-04 ENCOUNTER — CLINICAL SUPPORT (OUTPATIENT)
Dept: REHABILITATION | Facility: HOSPITAL | Age: 65
End: 2020-09-04
Attending: STUDENT IN AN ORGANIZED HEALTH CARE EDUCATION/TRAINING PROGRAM
Payer: MEDICARE

## 2020-09-04 DIAGNOSIS — R29.898 WEAKNESS OF RIGHT LOWER EXTREMITY: ICD-10-CM

## 2020-09-04 DIAGNOSIS — G89.29 CHRONIC RIGHT-SIDED LOW BACK PAIN WITH RIGHT-SIDED SCIATICA: ICD-10-CM

## 2020-09-04 DIAGNOSIS — M54.41 CHRONIC RIGHT-SIDED LOW BACK PAIN WITH RIGHT-SIDED SCIATICA: ICD-10-CM

## 2020-09-04 DIAGNOSIS — R26.2 DIFFICULTY WALKING: ICD-10-CM

## 2020-09-04 PROCEDURE — 97110 THERAPEUTIC EXERCISES: CPT | Mod: PO | Performed by: PHYSICAL THERAPIST

## 2020-09-04 NOTE — PROGRESS NOTES
[facility]      Name: Ap Chi  Clinic Number: [id number]    Therapy Diagnosis:   Encounter Diagnoses   Name Primary?    Chronic right-sided low back pain with right-sided sciatica     Weakness of right lower extremity     Difficulty walking      Physician: Hussain Willis MD    Visit Date: 2020    Physician Orders: PT Eval and Treat Healthy Back program  Medical Diagnosis from Referral: lumbar radiculopathy; right drop foot  Evaluation Date: 2020  Authorization Period Expiration: 20  Plan of Care Expiration: 20  Reassessment Due: 10th visit  Visit # / Visits authorized:       Time In: 9:10AM  Time Out: 9:50AM  Total Billable Time: 40 minutes  Precautions: Standard       Pattern of pain determined: Pattern 2       Subjective   Ap reports he is having neck fusion on 9/15/20, possibly C4-T1 for myelomalacia and cord compression. He is cancelling all further appointments at this time.     Patient reports tolerating previous visit without adverse effect  Patient reports their pain to be 2-3/10 on a 0-10 scale with 0 being no pain and 10 being the worst pain imaginable.  Pain Location: across low back, right posterior thigh and calf, occasional right ant thigh         Work and leisure: Occupation: construction work,   Leisure: work around house, currently building deck        Pt goals: strengthen legs and back, stay mobile       Objective     Baseline Isometric Testing on Med X equipment: Testing administered by PT  Date of testin20  ROM 3-42 deg   Max Peak Torque 139    Min Peak Torque 74    Flex/Ext Ratio 1.9:1   % below normative data 52         Outcomes:  Initial score: 46%   Visit 5 score:  Goal:      Treatment    Pt was instructed in and performed the following:     Ap received therapeutic exercises to develop/improved posture, cardiovascular endurance, muscular endurance, lumbar/cervical ROM, strength and muscular  endurance for 40 minutes including the following exercises:      Med x dynamic exercise- not performed   Stationary bike 10 min, seat 8  Single KTC 5x 5 sec  KTC 5x 5 sec  Post pelvic tilt x20  Hamstring stretch 3x 30 sec ea seated  Seated trunk flexion 5x 5 sec  LTR 5x 5 sec  HS curls with red ball x20    Not performed:  Peripheral muscle strengthening which included 1 set of 15-20 repetitions at a slow, controlled 10-13 second per rep pace focused on strengthening supporting musculature for improved body mechanics and functional mobility.  Pt and therapist focused on proper form during treatment to ensure optimal strengthening of each targeted muscle group.  Machines were utilized including torso rotation, leg extension, leg curl, chest press, upright row. Tricep extension, bicep curl, leg press, and hip abduction added visit 3        Home Exercises Provided and Patient Education Provided     Education provided:   - instructed in HEP, use of ice if pain flares up and use of lumbar support    Written Home Exercises Provided: yes.  Exercises were reviewed and Alberto was able to demonstrate them prior to the end of the session.  Alberto demonstrated good  understanding of the education provided.     See EMR under Patient Instructions for exercises provided 8/21/2020.          Assessment     Pt having cervical surgery on 9/15/20 and several appointments next week. Did not continue with Healthy Back program at this time, as pt no longer appropriate candidate. Reviewed lumbar and LE stretches and light exercise that would be safe for him to continue at this time. He reports being able to do some exercise in pool with decreased pain. Cautioned pt not to do pool exercise after surgery until cleared by surgeon.    Patient is making good progress towards established goals.  Pt will continue to benefit from skilled outpatient physical therapy to address the deficits stated in the impairment chart, provide pt/family  education and to maximize pt's level of independence in the home and community environment.     Anticipated Barriers for therapy: none  Pt's spiritual, cultural and educational needs considered and pt agreeable to plan of care and goals as stated below:             Goals: Not met  Short term goals:  6 weeks or 10 visits   1.  Pt will demonstrate increased lumbar ROM by at least 3 degrees from the initial ROM value with improvements noted in functional ROM and ability to perform ADLs.  2.  Pt will demonstrate increased MedX average isometric strength value  by 20% from initial test resulting in improved ability to perform bending, lifting, and carrying activities safely, confidently.     3.  Patient report a reduction in worst pain score by 1-2 points for improved tolerance for rising from sitting and getting out of bed.  4.  Pt able to perform HEP correctly with minimal cueing or supervision from therapist to encourage independent management of symptoms.         Long term goals: 10 weeks or 20 visits   1. Pt will demonstrate increased lumbar ROM by at least 6 degrees from initial ROM value, resulting in improved ability to perform functional fwd bending while standing and sitting.   2. Pt will demonstrate increased MedX average isometric strength value  by 30% from initial test resulting in improved ability to perform bending, lifting, and carrying activities safely, confidently.  3. Pt to demonstrate ability to independently control and reduce their pain through posture positioning and mechanical movements throughout a typical day.  4.  Pt will demonstrate reduced pain and improved functional outcomes as reported on the FOTO by reaching a limitation score of < or = 35% or less in order to demonstrate subjective improvement in pt's condition.    5. Pt will demonstrate independence with the HEP at discharge  6.  Pt will demonstrate improved LE and core strength and flexibility in order to keep mobile (patient  goal)        Plan    Plan to discharge from physical therapy, specifically Healthy Back program, at this time secondary to upcoming cervical surgery.

## 2020-09-08 ENCOUNTER — TELEPHONE (OUTPATIENT)
Dept: NEUROSURGERY | Facility: CLINIC | Age: 65
End: 2020-09-08

## 2020-09-08 DIAGNOSIS — Z98.1 S/P CERVICAL SPINAL FUSION: Primary | ICD-10-CM

## 2020-09-08 DIAGNOSIS — R79.1 ABNORMAL COAGULATION PROFILE: ICD-10-CM

## 2020-09-08 DIAGNOSIS — G95.89 MYELOMALACIA OF CERVICAL CORD: Primary | ICD-10-CM

## 2020-09-08 RX ORDER — SODIUM CHLORIDE, SODIUM LACTATE, POTASSIUM CHLORIDE, CALCIUM CHLORIDE 600; 310; 30; 20 MG/100ML; MG/100ML; MG/100ML; MG/100ML
INJECTION, SOLUTION INTRAVENOUS CONTINUOUS
Status: CANCELLED | OUTPATIENT
Start: 2020-09-08

## 2020-09-08 RX ORDER — LIDOCAINE HYDROCHLORIDE 10 MG/ML
1 INJECTION, SOLUTION EPIDURAL; INFILTRATION; INTRACAUDAL; PERINEURAL ONCE
Status: CANCELLED | OUTPATIENT
Start: 2020-09-08 | End: 2020-09-08

## 2020-09-08 NOTE — TELEPHONE ENCOUNTER
Mr Chi will be having surgery on 9/15/20 with Dr. Willis, Neurosurgeon, at [facility]. We are reaching out to obtain a surgical clearance from Dr. Mcnair to ensure the safety of our patient. The surgery is a cervical fusion and will be under general anesthesia. This procedure typically lasts approximately 4 hours. We request that the patient hold all anticoagulant/antiinflammatory medications for 5-7 days prior to surgery. Please let us know if our office can be of further assistance.     Thank you,     Gayle Rios LPN  P: [phone]  F: [phone]

## 2020-09-15 ENCOUNTER — DOCUMENTATION ONLY (OUTPATIENT)
Dept: REHABILITATION | Facility: HOSPITAL | Age: 65
End: 2020-09-15

## 2020-09-15 DIAGNOSIS — G89.29 CHRONIC RIGHT-SIDED LOW BACK PAIN WITH RIGHT-SIDED SCIATICA: Primary | ICD-10-CM

## 2020-09-15 DIAGNOSIS — R29.898 WEAKNESS OF RIGHT LOWER EXTREMITY: ICD-10-CM

## 2020-09-15 DIAGNOSIS — R26.2 DIFFICULTY WALKING: ICD-10-CM

## 2020-09-15 DIAGNOSIS — M54.41 CHRONIC RIGHT-SIDED LOW BACK PAIN WITH RIGHT-SIDED SCIATICA: Primary | ICD-10-CM

## 2020-09-15 PROBLEM — G95.89 MYELOMALACIA OF CERVICAL CORD: Status: ACTIVE | Noted: 2020-09-15

## 2020-09-15 NOTE — PROGRESS NOTES
Outpatient Therapy Discharge Summary     Name: Ap Chi  [facility] Number: [id number]    Therapy Diagnosis:   Encounter Diagnoses   Name Primary?    Chronic right-sided low back pain with right-sided sciatica Yes    Weakness of right lower extremity     Difficulty walking         Physician: Hussain Willis MD        Physician Orders: PT Eval and Treat Healthy Back program  Medical Diagnosis from Referral: lumbar radiculopathy; right drop foot  Evaluation Date: 8/12/2020        Date of Last visit: 9/4/20  Total Visits Received: 4      Assessment    Goals: NOT MET  Short term goals:  6 weeks or 10 visits   1.  Pt will demonstrate increased lumbar ROM by at least 3 degrees from the initial ROM value with improvements noted in functional ROM and ability to perform ADLs.  2.  Pt will demonstrate increased MedX average isometric strength value  by 20% from initial test resulting in improved ability to perform bending, lifting, and carrying activities safely, confidently.     3.  Patient report a reduction in worst pain score by 1-2 points for improved tolerance for rising from sitting and getting out of bed.  4.  Pt able to perform HEP correctly with minimal cueing or supervision from therapist to encourage independent management of symptoms.         Long term goals: 10 weeks or 20 visits   1. Pt will demonstrate increased lumbar ROM by at least 6 degrees from initial ROM value, resulting in improved ability to perform functional fwd bending while standing and sitting.   2. Pt will demonstrate increased MedX average isometric strength value  by 30% from initial test resulting in improved ability to perform bending, lifting, and carrying activities safely, confidently.  3. Pt to demonstrate ability to independently control and reduce their pain through posture positioning and mechanical movements throughout a typical day.  4.  Pt will demonstrate reduced pain and improved functional outcomes as reported on the  FOTO by reaching a limitation score of < or = 35% or less in order to demonstrate subjective improvement in pt's condition.    5. Pt will demonstrate independence with the HEP at discharge  6.  Pt will demonstrate improved LE and core strength and flexibility in order to keep mobile (patient goal)         Discharge reason: Other:   Plan to discharge from physical therapy, specifically Healthy Back program, at this time secondary to upcoming cervical surgery.         Plan   This patient is discharged from Physical Therapy

## 2020-09-25 ENCOUNTER — CLINICAL SUPPORT (OUTPATIENT)
Dept: NEUROSURGERY | Facility: CLINIC | Age: 65
End: 2020-09-25
Payer: MEDICARE

## 2020-09-25 PROCEDURE — 99999 PR PBB SHADOW E&M-EST. PATIENT-LVL I: ICD-10-PCS | Mod: PBBFAC,,,

## 2020-09-25 PROCEDURE — 99999 PR PBB SHADOW E&M-EST. PATIENT-LVL I: CPT | Mod: PBBFAC,,,

## 2020-09-25 RX ORDER — OXYCODONE AND ACETAMINOPHEN 7.5; 325 MG/1; MG/1
1 TABLET ORAL EVERY 6 HOURS PRN
Qty: 28 TABLET | Refills: 0 | Status: SHIPPED | OUTPATIENT
Start: 2020-09-25 | End: 2020-12-18

## 2020-09-25 RX ORDER — TIZANIDINE 4 MG/1
4 TABLET ORAL EVERY 8 HOURS PRN
Qty: 60 TABLET | Refills: 0 | Status: SHIPPED | OUTPATIENT
Start: 2020-09-25 | End: 2020-10-05

## 2020-09-25 NOTE — PROGRESS NOTES
Pt is 10 days s/p ACDF with Dr. Willis. No s/s of infection. Incision cleaned with chloraprep and steri strips removed with no issue. Incision is warm, dry, intact. Pt reports post-operative pain level < pre-operative state. Pt is requesting medication refill today. Pt re-educated on narcotics policy. Educated patient on weight lifting status, bending/lifting/twisting, and to call with any changes or questions. Pt aware of imaging and f/u appt with provider. No further questions.

## 2020-10-29 ENCOUNTER — OFFICE VISIT (OUTPATIENT)
Dept: NEUROSURGERY | Facility: CLINIC | Age: 65
End: 2020-10-29
Payer: MEDICARE

## 2020-10-29 ENCOUNTER — HOSPITAL ENCOUNTER (OUTPATIENT)
Dept: RADIOLOGY | Facility: HOSPITAL | Age: 65
Discharge: HOME | End: 2020-10-29
Attending: STUDENT IN AN ORGANIZED HEALTH CARE EDUCATION/TRAINING PROGRAM
Payer: MEDICARE

## 2020-10-29 VITALS
WEIGHT: 235 LBS | HEART RATE: 79 BPM | HEIGHT: 68 IN | DIASTOLIC BLOOD PRESSURE: 82 MMHG | BODY MASS INDEX: 35.61 KG/M2 | SYSTOLIC BLOOD PRESSURE: 125 MMHG

## 2020-10-29 DIAGNOSIS — G99.2 STENOSIS OF CERVICAL SPINE WITH MYELOPATHY: Primary | ICD-10-CM

## 2020-10-29 DIAGNOSIS — Z98.1 S/P CERVICAL SPINAL FUSION: ICD-10-CM

## 2020-10-29 DIAGNOSIS — M48.02 STENOSIS OF CERVICAL SPINE WITH MYELOPATHY: Primary | ICD-10-CM

## 2020-10-29 PROCEDURE — 99024 PR POST-OP FOLLOW-UP VISIT: ICD-10-PCS | Mod: S$GLB,,, | Performed by: STUDENT IN AN ORGANIZED HEALTH CARE EDUCATION/TRAINING PROGRAM

## 2020-10-29 PROCEDURE — 99999 PR PBB SHADOW E&M-EST. PATIENT-LVL III: ICD-10-PCS | Mod: PBBFAC,,, | Performed by: STUDENT IN AN ORGANIZED HEALTH CARE EDUCATION/TRAINING PROGRAM

## 2020-10-29 PROCEDURE — 99999 PR PBB SHADOW E&M-EST. PATIENT-LVL III: CPT | Mod: PBBFAC,,, | Performed by: STUDENT IN AN ORGANIZED HEALTH CARE EDUCATION/TRAINING PROGRAM

## 2020-10-29 PROCEDURE — 72040 XR CERVICAL SPINE AP LATERAL: ICD-10-PCS | Mod: 26,,, | Performed by: RADIOLOGY

## 2020-10-29 PROCEDURE — 72040 X-RAY EXAM NECK SPINE 2-3 VW: CPT | Mod: 26,,, | Performed by: RADIOLOGY

## 2020-10-29 PROCEDURE — 72040 X-RAY EXAM NECK SPINE 2-3 VW: CPT | Mod: TC,FY,PO

## 2020-10-29 PROCEDURE — 99024 POSTOP FOLLOW-UP VISIT: CPT | Mod: S$GLB,,, | Performed by: STUDENT IN AN ORGANIZED HEALTH CARE EDUCATION/TRAINING PROGRAM

## 2020-10-29 NOTE — PROGRESS NOTES
"REFERRING PHYSICIAN:    No ref. provider found  N/A    Postoperative visit #2.    CLINICAL PROGRESS:   Ap Chi is now  6 weeks post op  5-1 ACDF  Doing well  Improved radiculopathy and Hand sensory symptoms    Is doing restricted work ,minimizing strain/lifting    Does c/o back pain and leg pain - know spondylosis    MEDICATIONS:                   List reviewed, see chart for dosing details.    ALLERGIES:       Review of patient's allergies indicates:  No Known Allergies    PHYSICAL EXAMINATION:          VITAL SIGNS:   /82   Pulse 79   Ht 5' 7.5" (1.715 m)   Wt 106.6 kg (235 lb)   BMI 36.26 kg/m²     GENERAL:  Patient is well developed, well nourished, calm, collected, and in no apparent distress.  Incision is well healed    NEUROLOGICAL:  Strength in the left upper extremity is interossei 5/5,  5/5, Bicep 5/5, Tricep 5/5, Deltoid 5/5.  Strength in the right upper extremity is interossei 5/5,  5/5, Bicep 5/5, Tricep 5/5, Deltoid 5/5.  Sensation is intact to light touch.  Gait is stable    IMAGING DATA:  Stable alignment, intact hardware without subsidence      No flowsheet data found.      ASSESSMENT/PLAN:  Recovering very well  Xray look good  Improved symptoms  Reviewed activity modification   6wk 6 moxray      Back and leg pain, non specific ddd  Pain mg referral ,PT offerred    Advised to call the office Iif any questions or concerns arise.    This note was partially dictated using voice recognition software, so please excuse any errors that were not corrected.    "

## 2020-11-10 ENCOUNTER — TELEPHONE (OUTPATIENT)
Dept: NEUROSURGERY | Facility: CLINIC | Age: 65
End: 2020-11-10

## 2020-11-10 NOTE — TELEPHONE ENCOUNTER
Patient reports that ANN-MARIE was discussed regarding his lower back. I don't see a mention of it in his last note.  I did schedule a urology appointment with Dr. Hernandez for 12/2/20. He has not heard from Pain Management.

## 2020-11-12 ENCOUNTER — OFFICE VISIT (OUTPATIENT)
Dept: PAIN MEDICINE | Facility: CLINIC | Age: 65
End: 2020-11-12
Payer: MEDICARE

## 2020-11-12 VITALS
TEMPERATURE: 99 F | RESPIRATION RATE: 20 BRPM | HEIGHT: 68 IN | WEIGHT: 240.19 LBS | OXYGEN SATURATION: 98 % | DIASTOLIC BLOOD PRESSURE: 70 MMHG | BODY MASS INDEX: 36.4 KG/M2 | HEART RATE: 86 BPM | SYSTOLIC BLOOD PRESSURE: 134 MMHG

## 2020-11-12 DIAGNOSIS — G89.29 CHRONIC BILATERAL LOW BACK PAIN WITH BILATERAL SCIATICA: ICD-10-CM

## 2020-11-12 DIAGNOSIS — M54.42 CHRONIC BILATERAL LOW BACK PAIN WITH BILATERAL SCIATICA: ICD-10-CM

## 2020-11-12 DIAGNOSIS — M47.816 LUMBAR SPONDYLOSIS: ICD-10-CM

## 2020-11-12 DIAGNOSIS — M54.16 LUMBAR RADICULOPATHY: Primary | ICD-10-CM

## 2020-11-12 DIAGNOSIS — Z11.9 SCREENING EXAMINATION FOR INFECTIOUS DISEASE: ICD-10-CM

## 2020-11-12 DIAGNOSIS — M54.41 CHRONIC BILATERAL LOW BACK PAIN WITH BILATERAL SCIATICA: ICD-10-CM

## 2020-11-12 PROCEDURE — 1125F AMNT PAIN NOTED PAIN PRSNT: CPT | Mod: S$GLB,,, | Performed by: ANESTHESIOLOGY

## 2020-11-12 PROCEDURE — 99999 PR PBB SHADOW E&M-EST. PATIENT-LVL V: ICD-10-PCS | Mod: PBBFAC,,, | Performed by: ANESTHESIOLOGY

## 2020-11-12 PROCEDURE — 1101F PR PT FALLS ASSESS DOC 0-1 FALLS W/OUT INJ PAST YR: ICD-10-PCS | Mod: CPTII,S$GLB,, | Performed by: ANESTHESIOLOGY

## 2020-11-12 PROCEDURE — 3288F FALL RISK ASSESSMENT DOCD: CPT | Mod: CPTII,S$GLB,, | Performed by: ANESTHESIOLOGY

## 2020-11-12 PROCEDURE — 3008F PR BODY MASS INDEX (BMI) DOCUMENTED: ICD-10-PCS | Mod: CPTII,S$GLB,, | Performed by: ANESTHESIOLOGY

## 2020-11-12 PROCEDURE — 1101F PT FALLS ASSESS-DOCD LE1/YR: CPT | Mod: CPTII,S$GLB,, | Performed by: ANESTHESIOLOGY

## 2020-11-12 PROCEDURE — 1125F PR PAIN SEVERITY QUANTIFIED, PAIN PRESENT: ICD-10-PCS | Mod: S$GLB,,, | Performed by: ANESTHESIOLOGY

## 2020-11-12 PROCEDURE — 3288F PR FALLS RISK ASSESSMENT DOCUMENTED: ICD-10-PCS | Mod: CPTII,S$GLB,, | Performed by: ANESTHESIOLOGY

## 2020-11-12 PROCEDURE — 99204 OFFICE O/P NEW MOD 45 MIN: CPT | Mod: S$GLB,,, | Performed by: ANESTHESIOLOGY

## 2020-11-12 PROCEDURE — 99999 PR PBB SHADOW E&M-EST. PATIENT-LVL V: CPT | Mod: PBBFAC,,, | Performed by: ANESTHESIOLOGY

## 2020-11-12 PROCEDURE — 3008F BODY MASS INDEX DOCD: CPT | Mod: CPTII,S$GLB,, | Performed by: ANESTHESIOLOGY

## 2020-11-12 PROCEDURE — 99204 PR OFFICE/OUTPT VISIT, NEW, LEVL IV, 45-59 MIN: ICD-10-PCS | Mod: S$GLB,,, | Performed by: ANESTHESIOLOGY

## 2020-11-12 RX ORDER — SODIUM CHLORIDE, SODIUM LACTATE, POTASSIUM CHLORIDE, CALCIUM CHLORIDE 600; 310; 30; 20 MG/100ML; MG/100ML; MG/100ML; MG/100ML
INJECTION, SOLUTION INTRAVENOUS CONTINUOUS
Status: CANCELLED | OUTPATIENT
Start: 2020-11-20

## 2020-11-12 NOTE — H&P (VIEW-ONLY)
[facility] New Patient Evaluation    Referred by: Dr. Willis  Reason for referral: back pain    CC:   Chief Complaint   Patient presents with    Establish Care    Low-back Pain      Last 3 PDI Scores 11/12/2020   Pain Disability Index (PDI) 31       HPI:   Ap Chi is a 65 y.o. male who complains of back pain    Onset: over a year  Inciting Event: none  Progression: since onset, pain is gradually worsening  Current Pain Score: 6/10  Typical Range: 2-8/10  Timing: constant  Quality: aching, throbbing  Radiation: yes, down both legs  Associated numbness or weakness: yes numbness in feet has improved since surgery, yes weakness right leg chronic  Exacerbated by: standing, walking, activity  Allievated by: rest  Is Pain Level Acceptable?: No    Previous Therapies:  PT/OT: yes, in the past  HEP:   Interventions:   Surgery:  Medications:   - NSAIDS:   - MSK Relaxants:   - TCAs:   - SNRIs:   - Topicals:   - Anticonvulsants:  - Opioids:     History:    Current Outpatient Medications:     aspirin (ECOTRIN) 81 MG EC tablet, Take 1 tablet (81 mg total) by mouth once daily., Disp: 90 tablet, Rfl: 3    levothyroxine (SYNTHROID) 137 MCG Tab tablet, Take 137 mcg by mouth once daily. TAKE AM OF SURGERY, Disp: , Rfl:     oxyCODONE-acetaminophen (PERCOCET) 7.5-325 mg per tablet, Take 1 tablet by mouth every 6 (six) hours as needed for Pain. (Patient not taking: Reported on 11/12/2020), Disp: 28 tablet, Rfl: 0    rosuvastatin (CRESTOR) 20 MG tablet, Take 1 tablet (20 mg total) by mouth once daily. (Patient taking differently: Take 20 mg by mouth once daily. TAKE AM OF SURGERY), Disp: 90 tablet, Rfl: 3    Past Medical History:   Diagnosis Date    Anticoagulant long-term use     Foot drop, right foot     Hyperlipidemia     Thyroid disease        Past Surgical History:   Procedure Laterality Date    ANTERIOR CERVICAL DISCECTOMY W/ FUSION N/A 9/15/2020    Procedure: DISCECTOMY, SPINE, CERVICAL, ANTERIOR  APPROACH, WITH FUSION  C4-5, C5-6, C6-7, C7-1;  Surgeon: Hussain Willis MD;  Location: [facility] OR;  Service: Neurosurgery;  Laterality: N/A;    CORONARY ANGIOGRAPHY Left 2020    Procedure: ANGIOGRAM, CORONARY ARTERY;  Surgeon: Jacobo Mcnair MD;  Location: [facility] CATH;  Service: Cardiology;  Laterality: Left;    LEFT HEART CATHETERIZATION Left 2020    Procedure: Left heart cath;  Surgeon: Jacobo Mcnair MD;  Location: [facility] CATH;  Service: Cardiology;  Laterality: Left;    NASAL SEPTUM SURGERY      VASECTOMY         Family History   Problem Relation Age of Onset    Cancer Mother     Heart disease Father 59        CABG    Stroke Father     Heart disease Brother         mild CAD       Social History     Socioeconomic History    Marital status:      Spouse name: Not on file    Number of children: Not on file    Years of education: Not on file    Highest education level: Not on file   Occupational History    Not on file   Social Needs    Financial resource strain: Not hard at all    Food insecurity     Worry: Never true     Inability: Never true    Transportation needs     Medical: No     Non-medical: No   Tobacco Use    Smoking status: Former Smoker     Quit date:      Years since quittin.8    Smokeless tobacco: Never Used    Tobacco comment: tried as a teen ager.   Substance and Sexual Activity    Alcohol use: Yes     Frequency: Monthly or less     Drinks per session: 1 or 2     Binge frequency: Never     Comment: Rare beer.    Drug use: Never    Sexual activity: Yes     Partners: Female   Lifestyle    Physical activity     Days per week: 0 days     Minutes per session: Not on file    Stress: Not at all   Relationships    Social connections     Talks on phone: More than three times a week     Gets together: More than three times a week     Attends Orthodox service: Not on file     Active member of club or organization: Yes     Attends meetings of clubs or organizations: More  "than 4 times per year     Relationship status:    Other Topics Concern    Not on file   Social History Narrative    Not on file       Review of patient's allergies indicates:  No Known Allergies    Review of Systems:  General ROS: negative for - fever  Psychological ROS: negative for - hostility  Hematological and Lymphatic ROS: negative for - bleeding problems  Endocrine ROS: negative for - unexpected weight changes  Respiratory ROS: no cough, shortness of breath, or wheezing  Cardiovascular ROS: no chest pain or dyspnea on exertion  Gastrointestinal ROS: no abdominal pain, change in bowel habits, or black or bloody stools  Musculoskeletal ROS: positive for - muscular weakness  Neurological ROS: positive for - numbness/tingling  Dermatological ROS: negative for rash    Physical Exam:  Vitals:    11/12/20 1500   BP: 134/70   Pulse: 86   Resp: 20   Temp: 98.8 °F (37.1 °C)   TempSrc: Temporal   SpO2: 98%   Weight: 109 kg (240 lb 3.1 oz)   Height: 5' 8" (1.727 m)   PainSc:   6   PainLoc: Back     Body mass index is 36.52 kg/m².     Gen: NAD  Gait: gait intact  Psych:  Mood appropriate for given condition  HEENT: eyes anicteric   GI: Abd soft  CV: RRR  Lungs: breathing unlabored   ROM: limited AROM of the L spine in all planes, full ROM at ankles, knees and hips  Lumbar flexion 90 degrees, extension 50 degrees, side bending 30 degrees.    Sensation: intact to light touch in all dermatomes tested from L2-S1 bilaterally  Reflexes: 3+ b/l patella and 2+ b/l achilles  Palpation: Diffusely tender over lumbar paraspinals  -TTP over the b/l greater trochanters and bilateral SI joint  Tone: normal in the b/l knees and hips   Skin: intact  Extremities: No edema in b/l ankles or hands  Provacative tests: + bilateral axial facet loading       Right Left   L2/3 Iliacus Hip flexion  5  5   L3/4 Qudratus Femoris Knee Extension  5  5   L4/5 Tib Anterior Ankle Dorsiflexion   5  5   L5/S1 Extensor Hallicus Longus Great toe " extension  5  5                 S1/S2 Gastroc/Soleus Plantar Flexion  5  5       Imaging:  MRI lumbar spine 7/18/20  FINDINGS:  The lumbar vertebral body heights appear to be maintained.  There appears appears to be 3-4 mm retrolisthesis of L1 on L2.  Scattered discogenic endplate degenerative signal changes are seen, most pronounced at the L3-L4 through L5-S1 levels.  No suspicious bone marrow edema suggestive of acute fracture is visualized.  There is severe disc space narrowing at L5-S1 with evidence of partial fusion at the posterior L5-S1 level.   The conus medullaris terminate at approximately the L1-L2 level.     L1-L2 demonstrates severe disc space narrowing, disc desiccation, moderate broad-based disc bulge, ligamentum flavum hypertrophy, and mild bilateral facet arthrosis without significant overall central spinal canal stenosis.  Mild to moderate bilateral lateral recess narrowing is seen.  Moderate to severe right and moderate left neural foraminal narrowing is noted.  L2-L3 demonstrates mild disc height loss, disc desiccation, moderate broad-based disc bulge with superimposed shallow right paracentral broad-based disc protrusion, ligamentum flavum hypertrophy, and mild-to-moderate bilateral facet arthrosis.  Mild central spinal canal narrowing is seen.  Mild to moderate bilateral lateral recess narrowing is seen.  Moderate left and mild right neural foraminal narrowing is noted.  L3-L4 demonstrates severe disc space narrowing, severe broad-based disc bulge with superimposed endplate marginal osteophytes asymmetric slightly to the right, ligamentum flavum hypertrophy, and moderate bilateral facet arthrosis.  Mild central spinal canal narrowing is seen.  Severe bilateral lateral recess narrowing is seen.  Moderate left and severe right neural foraminal stenosis is seen.  L4-L5 demonstrates severe disc space narrowing, severe broad-based disc bulge with superimposed endplate marginal osteophytes,  ligamentum flavum hypertrophy, and moderate to severe bilateral facet arthrosis.  Mild central spinal canal stenosis is seen.  Severe bilateral lateral recess narrowing and severe bilateral neural foraminal narrowing is seen.  L5-S1 demonstrates severe disc space narrowing with suspected partial posterior fusion.  Posterior moderate endplate spurring is seen.  Moderate right greater than left facet arthrosis is noted.  No significant overall central spinal canal stenosis is appreciated.  Moderate bilateral lateral recess narrowing is noted.  Moderate to severe bilateral neural foraminal stenosis is seen.     Labs:  BMP  Lab Results   Component Value Date     09/16/2020    K 4.2 09/16/2020     09/16/2020    CO2 24 09/16/2020    BUN 11 09/16/2020    CREATININE 0.68 09/16/2020    CALCIUM 8.5 09/16/2020    ANIONGAP 7 (L) 09/16/2020    ESTGFRAFRICA >60 09/16/2020    EGFRNONAA >60 09/16/2020     Lab Results   Component Value Date    ALT 32 05/06/2020    AST 28 05/06/2020    ALKPHOS 97 05/06/2020    BILITOT 0.2 05/06/2020       Assessment:   Problem List Items Addressed This Visit     None      Visit Diagnoses     Lumbar radiculopathy    -  Primary    Relevant Orders    Case Request Operating Room: Injection-steroid-epidural-lumbar L5/S1 (Completed)    Screening examination for infectious disease        Relevant Orders    COVID-19 Routine Screening    Chronic bilateral low back pain with bilateral sciatica        Lumbar spondylosis              65 y.o. year old male with PMH CAD, hypothyroidism presents to the office with back pain.  He initially presented to neurosurgery with back pain but was found to have cervical myelopathy.  He is now s/p cervical ACDF and has improvement in his pain and also his strength.  Today he reports he does continue to have some lower back pain with radiation down the back of his legs that is worse with walking.  He denies any new or worsening balance problems, saddle anesthesia,  or bowel/bladder dysfunction.  On exam today he has full strength of his lower extremities, 3+ b/l patellar, and 2+ b/l achilles dtr.  He has pain with axial facet loading.  I independently reviewed his lumbar MRI and it is c/w L3-4 and L4-5 severe bilateral lateral recess narrowing.  He also has multilevel bilateral facet arthropathy and foraminal narrowing.  He has a combination of radicular pain and facet mediated pain.  His pain is limiting his mobility and interfering with his ADL's.  We will schedule for lumbar ANN-MARIE.  Follow up 2 weeks post injection.  In the future we can address his facet mediated pain.    Treatment Plan:   Procedures: L5/S1 ILESI. Procedure explained using an anatomical model.  Risks, benefits, alternatives explained to patient who verbalized understanding, including increased risk of infection, bleeding, need for additional procedures or surgery, and nerve damage.  Questions regarding the procedure, risks, expected outcome, and possible side effects were solicited and answered to the patient's satisfaction.  Alberto wishes to proceed with the injection.  Verbal and written consent were obtained in clinic today.  PT/OT/HEP: continue home exercises  Medications: we will get clearance to hold ASA prior to ANN-MARIE  Labs: Reviewed and medications are appropriately dosed for current hepatorenal function.  Imaging: No additional recommended at this time.    : Not applicable    Ash Weldon M.D.  Interventional Pain Medicine / Anesthesiology

## 2020-11-12 NOTE — LETTER
November 12, 2020      Hussain Willis MD  [address] [facility] [address] [place] [address]           [facility]  [address] [place] [address]  Phone: [phone]  Fax: [phone]          Patient: Ap Chi   MR Number: [id number]   YOB: 1955   Date of Visit: 11/12/2020       Dear Dr. Hussain Willis:    Thank you for referring Ap Chi to me for evaluation. Attached you will find relevant portions of my assessment and plan of care.    If you have questions, please do not hesitate to call me. I look forward to following Ap Chi along with you.    Sincerely,    Ash Weldon MD    Enclosure  CC:  No Recipients    If you would like to receive this communication electronically, please contact [email or web address] or [phone] to request more information on [employer] Link access.    For providers and/or their staff who would like to refer a patient to [facility], please contact us through our one-stop-shop provider referral line, [facility], at [phone].    If you feel you have received this communication in error or would no longer like to receive these types of communications, please e-mail [email or web address]

## 2020-11-12 NOTE — PROGRESS NOTES
[facility] New Patient Evaluation    Referred by: Dr. Willis  Reason for referral: back pain    CC:   Chief Complaint   Patient presents with    Establish Care    Low-back Pain      Last 3 PDI Scores 11/12/2020   Pain Disability Index (PDI) 31       HPI:   Ap Chi is a 65 y.o. male who complains of back pain    Onset: over a year  Inciting Event: none  Progression: since onset, pain is gradually worsening  Current Pain Score: 6/10  Typical Range: 2-8/10  Timing: constant  Quality: aching, throbbing  Radiation: yes, down both legs  Associated numbness or weakness: yes numbness in feet has improved since surgery, yes weakness right leg chronic  Exacerbated by: standing, walking, activity  Allievated by: rest  Is Pain Level Acceptable?: No    Previous Therapies:  PT/OT: yes, in the past  HEP:   Interventions:   Surgery:  Medications:   - NSAIDS:   - MSK Relaxants:   - TCAs:   - SNRIs:   - Topicals:   - Anticonvulsants:  - Opioids:     History:    Current Outpatient Medications:     aspirin (ECOTRIN) 81 MG EC tablet, Take 1 tablet (81 mg total) by mouth once daily., Disp: 90 tablet, Rfl: 3    levothyroxine (SYNTHROID) 137 MCG Tab tablet, Take 137 mcg by mouth once daily. TAKE AM OF SURGERY, Disp: , Rfl:     oxyCODONE-acetaminophen (PERCOCET) 7.5-325 mg per tablet, Take 1 tablet by mouth every 6 (six) hours as needed for Pain. (Patient not taking: Reported on 11/12/2020), Disp: 28 tablet, Rfl: 0    rosuvastatin (CRESTOR) 20 MG tablet, Take 1 tablet (20 mg total) by mouth once daily. (Patient taking differently: Take 20 mg by mouth once daily. TAKE AM OF SURGERY), Disp: 90 tablet, Rfl: 3    Past Medical History:   Diagnosis Date    Anticoagulant long-term use     Foot drop, right foot     Hyperlipidemia     Thyroid disease        Past Surgical History:   Procedure Laterality Date    ANTERIOR CERVICAL DISCECTOMY W/ FUSION N/A 9/15/2020    Procedure: DISCECTOMY, SPINE, CERVICAL, ANTERIOR  APPROACH, WITH FUSION  C4-5, C5-6, C6-7, C7-1;  Surgeon: Hussain Willis MD;  Location: [facility] OR;  Service: Neurosurgery;  Laterality: N/A;    CORONARY ANGIOGRAPHY Left 2020    Procedure: ANGIOGRAM, CORONARY ARTERY;  Surgeon: Jacobo Mcnair MD;  Location: [facility] CATH;  Service: Cardiology;  Laterality: Left;    LEFT HEART CATHETERIZATION Left 2020    Procedure: Left heart cath;  Surgeon: Jacobo Mcnair MD;  Location: [facility] CATH;  Service: Cardiology;  Laterality: Left;    NASAL SEPTUM SURGERY      VASECTOMY         Family History   Problem Relation Age of Onset    Cancer Mother     Heart disease Father 59        CABG    Stroke Father     Heart disease Brother         mild CAD       Social History     Socioeconomic History    Marital status:      Spouse name: Not on file    Number of children: Not on file    Years of education: Not on file    Highest education level: Not on file   Occupational History    Not on file   Social Needs    Financial resource strain: Not hard at all    Food insecurity     Worry: Never true     Inability: Never true    Transportation needs     Medical: No     Non-medical: No   Tobacco Use    Smoking status: Former Smoker     Quit date:      Years since quittin.8    Smokeless tobacco: Never Used    Tobacco comment: tried as a teen ager.   Substance and Sexual Activity    Alcohol use: Yes     Frequency: Monthly or less     Drinks per session: 1 or 2     Binge frequency: Never     Comment: Rare beer.    Drug use: Never    Sexual activity: Yes     Partners: Female   Lifestyle    Physical activity     Days per week: 0 days     Minutes per session: Not on file    Stress: Not at all   Relationships    Social connections     Talks on phone: More than three times a week     Gets together: More than three times a week     Attends Hindu service: Not on file     Active member of club or organization: Yes     Attends meetings of clubs or organizations: More  "than 4 times per year     Relationship status:    Other Topics Concern    Not on file   Social History Narrative    Not on file       Review of patient's allergies indicates:  No Known Allergies    Review of Systems:  General ROS: negative for - fever  Psychological ROS: negative for - hostility  Hematological and Lymphatic ROS: negative for - bleeding problems  Endocrine ROS: negative for - unexpected weight changes  Respiratory ROS: no cough, shortness of breath, or wheezing  Cardiovascular ROS: no chest pain or dyspnea on exertion  Gastrointestinal ROS: no abdominal pain, change in bowel habits, or black or bloody stools  Musculoskeletal ROS: positive for - muscular weakness  Neurological ROS: positive for - numbness/tingling  Dermatological ROS: negative for rash    Physical Exam:  Vitals:    11/12/20 1500   BP: 134/70   Pulse: 86   Resp: 20   Temp: 98.8 °F (37.1 °C)   TempSrc: Temporal   SpO2: 98%   Weight: 109 kg (240 lb 3.1 oz)   Height: 5' 8" (1.727 m)   PainSc:   6   PainLoc: Back     Body mass index is 36.52 kg/m².     Gen: NAD  Gait: gait intact  Psych:  Mood appropriate for given condition  HEENT: eyes anicteric   GI: Abd soft  CV: RRR  Lungs: breathing unlabored   ROM: limited AROM of the L spine in all planes, full ROM at ankles, knees and hips  Lumbar flexion 90 degrees, extension 50 degrees, side bending 30 degrees.    Sensation: intact to light touch in all dermatomes tested from L2-S1 bilaterally  Reflexes: 3+ b/l patella and 2+ b/l achilles  Palpation: Diffusely tender over lumbar paraspinals  -TTP over the b/l greater trochanters and bilateral SI joint  Tone: normal in the b/l knees and hips   Skin: intact  Extremities: No edema in b/l ankles or hands  Provacative tests: + bilateral axial facet loading       Right Left   L2/3 Iliacus Hip flexion  5  5   L3/4 Qudratus Femoris Knee Extension  5  5   L4/5 Tib Anterior Ankle Dorsiflexion   5  5   L5/S1 Extensor Hallicus Longus Great toe " extension  5  5                 S1/S2 Gastroc/Soleus Plantar Flexion  5  5       Imaging:  MRI lumbar spine 7/18/20  FINDINGS:  The lumbar vertebral body heights appear to be maintained.  There appears appears to be 3-4 mm retrolisthesis of L1 on L2.  Scattered discogenic endplate degenerative signal changes are seen, most pronounced at the L3-L4 through L5-S1 levels.  No suspicious bone marrow edema suggestive of acute fracture is visualized.  There is severe disc space narrowing at L5-S1 with evidence of partial fusion at the posterior L5-S1 level.   The conus medullaris terminate at approximately the L1-L2 level.     L1-L2 demonstrates severe disc space narrowing, disc desiccation, moderate broad-based disc bulge, ligamentum flavum hypertrophy, and mild bilateral facet arthrosis without significant overall central spinal canal stenosis.  Mild to moderate bilateral lateral recess narrowing is seen.  Moderate to severe right and moderate left neural foraminal narrowing is noted.  L2-L3 demonstrates mild disc height loss, disc desiccation, moderate broad-based disc bulge with superimposed shallow right paracentral broad-based disc protrusion, ligamentum flavum hypertrophy, and mild-to-moderate bilateral facet arthrosis.  Mild central spinal canal narrowing is seen.  Mild to moderate bilateral lateral recess narrowing is seen.  Moderate left and mild right neural foraminal narrowing is noted.  L3-L4 demonstrates severe disc space narrowing, severe broad-based disc bulge with superimposed endplate marginal osteophytes asymmetric slightly to the right, ligamentum flavum hypertrophy, and moderate bilateral facet arthrosis.  Mild central spinal canal narrowing is seen.  Severe bilateral lateral recess narrowing is seen.  Moderate left and severe right neural foraminal stenosis is seen.  L4-L5 demonstrates severe disc space narrowing, severe broad-based disc bulge with superimposed endplate marginal osteophytes,  ligamentum flavum hypertrophy, and moderate to severe bilateral facet arthrosis.  Mild central spinal canal stenosis is seen.  Severe bilateral lateral recess narrowing and severe bilateral neural foraminal narrowing is seen.  L5-S1 demonstrates severe disc space narrowing with suspected partial posterior fusion.  Posterior moderate endplate spurring is seen.  Moderate right greater than left facet arthrosis is noted.  No significant overall central spinal canal stenosis is appreciated.  Moderate bilateral lateral recess narrowing is noted.  Moderate to severe bilateral neural foraminal stenosis is seen.     Labs:  BMP  Lab Results   Component Value Date     09/16/2020    K 4.2 09/16/2020     09/16/2020    CO2 24 09/16/2020    BUN 11 09/16/2020    CREATININE 0.68 09/16/2020    CALCIUM 8.5 09/16/2020    ANIONGAP 7 (L) 09/16/2020    ESTGFRAFRICA >60 09/16/2020    EGFRNONAA >60 09/16/2020     Lab Results   Component Value Date    ALT 32 05/06/2020    AST 28 05/06/2020    ALKPHOS 97 05/06/2020    BILITOT 0.2 05/06/2020       Assessment:   Problem List Items Addressed This Visit     None      Visit Diagnoses     Lumbar radiculopathy    -  Primary    Relevant Orders    Case Request Operating Room: Injection-steroid-epidural-lumbar L5/S1 (Completed)    Screening examination for infectious disease        Relevant Orders    COVID-19 Routine Screening    Chronic bilateral low back pain with bilateral sciatica        Lumbar spondylosis              65 y.o. year old male with PMH CAD, hypothyroidism presents to the office with back pain.  He initially presented to neurosurgery with back pain but was found to have cervical myelopathy.  He is now s/p cervical ACDF and has improvement in his pain and also his strength.  Today he reports he does continue to have some lower back pain with radiation down the back of his legs that is worse with walking.  He denies any new or worsening balance problems, saddle anesthesia,  or bowel/bladder dysfunction.  On exam today he has full strength of his lower extremities, 3+ b/l patellar, and 2+ b/l achilles dtr.  He has pain with axial facet loading.  I independently reviewed his lumbar MRI and it is c/w L3-4 and L4-5 severe bilateral lateral recess narrowing.  He also has multilevel bilateral facet arthropathy and foraminal narrowing.  He has a combination of radicular pain and facet mediated pain.  His pain is limiting his mobility and interfering with his ADL's.  We will schedule for lumbar ANN-MARIE.  Follow up 2 weeks post injection.  In the future we can address his facet mediated pain.    Treatment Plan:   Procedures: L5/S1 ILESI. Procedure explained using an anatomical model.  Risks, benefits, alternatives explained to patient who verbalized understanding, including increased risk of infection, bleeding, need for additional procedures or surgery, and nerve damage.  Questions regarding the procedure, risks, expected outcome, and possible side effects were solicited and answered to the patient's satisfaction.  Alberto wishes to proceed with the injection.  Verbal and written consent were obtained in clinic today.  PT/OT/HEP: continue home exercises  Medications: we will get clearance to hold ASA prior to ANN-MARIE  Labs: Reviewed and medications are appropriately dosed for current hepatorenal function.  Imaging: No additional recommended at this time.    : Not applicable    Ash Weldon M.D.  Interventional Pain Medicine / Anesthesiology

## 2020-11-17 ENCOUNTER — LAB VISIT (OUTPATIENT)
Dept: FAMILY MEDICINE | Facility: CLINIC | Age: 65
End: 2020-11-17
Payer: MEDICARE

## 2020-11-17 DIAGNOSIS — Z11.9 SCREENING EXAMINATION FOR INFECTIOUS DISEASE: ICD-10-CM

## 2020-11-17 PROCEDURE — U0003 INFECTIOUS AGENT DETECTION BY NUCLEIC ACID (DNA OR RNA); SEVERE ACUTE RESPIRATORY SYNDROME CORONAVIRUS 2 (SARS-COV-2) (CORONAVIRUS DISEASE [COVID-19]), AMPLIFIED PROBE TECHNIQUE, MAKING USE OF HIGH THROUGHPUT TECHNOLOGIES AS DESCRIBED BY CMS-2020-01-R: HCPCS

## 2020-11-19 LAB — SARS-COV-2 RNA RESP QL NAA+PROBE: NOT DETECTED

## 2020-11-20 ENCOUNTER — HOSPITAL ENCOUNTER (OUTPATIENT)
Facility: HOSPITAL | Age: 65
Discharge: HOME | End: 2020-11-20
Attending: ANESTHESIOLOGY | Admitting: ANESTHESIOLOGY
Payer: MEDICARE

## 2020-11-20 ENCOUNTER — HOSPITAL ENCOUNTER (OUTPATIENT)
Dept: RADIOLOGY | Facility: HOSPITAL | Age: 65
Discharge: HOME | End: 2020-11-20
Attending: ANESTHESIOLOGY | Admitting: ANESTHESIOLOGY
Payer: MEDICARE

## 2020-11-20 VITALS
BODY MASS INDEX: 36.37 KG/M2 | HEIGHT: 68 IN | HEART RATE: 72 BPM | TEMPERATURE: 99 F | SYSTOLIC BLOOD PRESSURE: 145 MMHG | RESPIRATION RATE: 15 BRPM | WEIGHT: 240 LBS | OXYGEN SATURATION: 98 % | DIASTOLIC BLOOD PRESSURE: 83 MMHG

## 2020-11-20 DIAGNOSIS — M54.16 LUMBAR RADICULOPATHY: ICD-10-CM

## 2020-11-20 DIAGNOSIS — M54.16 LUMBAR RADICULOPATHY: Primary | ICD-10-CM

## 2020-11-20 PROCEDURE — 25500020 PHARM REV CODE 255: Mod: PO | Performed by: ANESTHESIOLOGY

## 2020-11-20 PROCEDURE — 62323 NJX INTERLAMINAR LMBR/SAC: CPT | Mod: PO | Performed by: ANESTHESIOLOGY

## 2020-11-20 PROCEDURE — 25000003 PHARM REV CODE 250: Mod: PO | Performed by: ANESTHESIOLOGY

## 2020-11-20 PROCEDURE — 62323 NJX INTERLAMINAR LMBR/SAC: CPT | Mod: ,,, | Performed by: ANESTHESIOLOGY

## 2020-11-20 PROCEDURE — 76000 FLUOROSCOPY <1 HR PHYS/QHP: CPT | Mod: TC,PO

## 2020-11-20 PROCEDURE — 63600175 PHARM REV CODE 636 W HCPCS: Mod: PO | Performed by: ANESTHESIOLOGY

## 2020-11-20 PROCEDURE — 62323 PR INJ LUMBAR/SACRAL, W/IMAGING GUIDANCE: ICD-10-PCS | Mod: ,,, | Performed by: ANESTHESIOLOGY

## 2020-11-20 RX ORDER — MIDAZOLAM HYDROCHLORIDE 1 MG/ML
INJECTION INTRAMUSCULAR; INTRAVENOUS
Status: DISCONTINUED | OUTPATIENT
Start: 2020-11-20 | End: 2020-11-20 | Stop reason: HOSPADM

## 2020-11-20 RX ORDER — SODIUM CHLORIDE, SODIUM LACTATE, POTASSIUM CHLORIDE, CALCIUM CHLORIDE 600; 310; 30; 20 MG/100ML; MG/100ML; MG/100ML; MG/100ML
INJECTION, SOLUTION INTRAVENOUS CONTINUOUS
Status: DISCONTINUED | OUTPATIENT
Start: 2020-11-20 | End: 2020-11-20 | Stop reason: HOSPADM

## 2020-11-20 RX ORDER — LIDOCAINE HYDROCHLORIDE 10 MG/ML
INJECTION, SOLUTION EPIDURAL; INFILTRATION; INTRACAUDAL; PERINEURAL
Status: DISCONTINUED | OUTPATIENT
Start: 2020-11-20 | End: 2020-11-20 | Stop reason: HOSPADM

## 2020-11-20 RX ORDER — METHYLPREDNISOLONE ACETATE 80 MG/ML
INJECTION, SUSPENSION INTRA-ARTICULAR; INTRALESIONAL; INTRAMUSCULAR; SOFT TISSUE
Status: DISCONTINUED | OUTPATIENT
Start: 2020-11-20 | End: 2020-11-20 | Stop reason: HOSPADM

## 2020-11-20 RX ORDER — SODIUM BICARBONATE 42 MG/ML
INJECTION, SOLUTION INTRAVENOUS
Status: DISCONTINUED | OUTPATIENT
Start: 2020-11-20 | End: 2020-11-20 | Stop reason: HOSPADM

## 2020-11-20 NOTE — INTERVAL H&P NOTE
The patient has been examined and the H&P has been reviewed:    I concur with the findings and no changes have occurred since H&P was written.  ASA 2, MP I    There are no hospital problems to display for this patient.

## 2020-11-20 NOTE — DISCHARGE SUMMARY
[facility]  Discharge Note  Short Stay    Admit Date: 11/20/2020    Discharge Date: 11/20/2020    Attending Physician: Ash Weldon     Discharge Provider: Ash Weldon    Diagnoses:  Active Hospital Problems    Diagnosis  POA    Lumbar radiculopathy [M54.16]  Yes      Resolved Hospital Problems   No resolved problems to display.       Discharged Condition: Good    Final Diagnoses: Lumbar radiculopathy [M54.16]    Disposition: Home or Self Care    Hospital Course: No complications, uneventful    Outcome of Hospitalization, Treatment, Procedure, or Surgery:  Patient was admitted for outpatient interventional pain management procedure. The patient tolerated the procedure well with no complications.    Follow up/Patient Instructions:  Follow up as scheduled in Pain Management office in 3-4 weeks.  Patient has received instructions and follow up date and time.    Medications:  Continue previous medications.  Restart ASA in 24 hours    Discharge Procedure Orders   Notify your health care provider if you experience any of the following:  temperature >100.4     Notify your health care provider if you experience any of the following:  persistent nausea and vomiting or diarrhea     Notify your health care provider if you experience any of the following:  severe uncontrolled pain     Notify your health care provider if you experience any of the following:  redness, tenderness, or signs of infection (pain, swelling, redness, odor or green/yellow discharge around incision site)     Notify your health care provider if you experience any of the following:  difficulty breathing or increased cough     Notify your health care provider if you experience any of the following:  severe persistent headache     Notify your health care provider if you experience any of the following:  worsening rash     Notify your health care provider if you experience any of the following:  persistent dizziness, light-headedness, or visual  disturbances     Notify your health care provider if you experience any of the following:  increased confusion or weakness     Activity as tolerated         Discharge Procedure Orders (must include Diet, Follow-up, Activity):   Discharge Procedure Orders (must include Diet, Follow-up, Activity)   Notify your health care provider if you experience any of the following:  temperature >100.4     Notify your health care provider if you experience any of the following:  persistent nausea and vomiting or diarrhea     Notify your health care provider if you experience any of the following:  severe uncontrolled pain     Notify your health care provider if you experience any of the following:  redness, tenderness, or signs of infection (pain, swelling, redness, odor or green/yellow discharge around incision site)     Notify your health care provider if you experience any of the following:  difficulty breathing or increased cough     Notify your health care provider if you experience any of the following:  severe persistent headache     Notify your health care provider if you experience any of the following:  worsening rash     Notify your health care provider if you experience any of the following:  persistent dizziness, light-headedness, or visual disturbances     Notify your health care provider if you experience any of the following:  increased confusion or weakness     Activity as tolerated

## 2020-11-20 NOTE — OP NOTE
"Procedure Note    Procedure Date: 11/20/2020    Procedure Performed:  L5/S1 lumbar interlaminar epidural steroid injection under fluoroscopy.    Indications: Patient has failed conservative therapy.      Pre-op diagnosis: Lumbar Radiculopathy    Post-op diagnosis: same    Physician: Ash Weldon MD    IV Sedation medications: Moderate sedation was achieved with midazolam 2mg.  Continuous monitoring of EKG, blood pressure and pulse oximetry was provided by a registered nurse during the entire course of the procedure under my supervision and recorded in the patient's medical record.   Total time for sedation was 12 minutes.    Medications injected: depomedrol 80mg, 1% Lidocaine 1ml, 2 mL sterile, preservative-free normal saline.    Local anesthetic used: 1% Lidocaine, 1 ml, 8.4% sodium bicarbonate 0.25ml    Estimated Blood Loss: none    Complications:  none    Technique:  The patient was interviewed in the holding area and Risks/Benefits were discussed, including, but not limited to, the possibility of new or different pain, bleeding or infection.   All questions were answered.  The patient understood and accepted risks.  Consent was verfied.  A time-out was taken to identify patient and procedure prior to starting the procedure. The patient was placed in the prone position on the fluoroscopy table. The area of the lumbar spine was prepped with Chloraprep and draped in a sterile manner. The L5/S1 interspace was identified and marked under AP fluoroscopy. The skin and subcutaneous tissues overlying the targeted interspace were anesthetized with 3-5 mL of 1% lidocaine using a 25G, 1.5" needle.  A 20G, 3.5" Tuohy epidural needle was directed toward the interspace under fluoroscopic guidance until the ligamentum flavum was engaged. From this point, a loss of resistance technique with a glass syringe and saline was used to identify entrance of the needle into the epidural space. Once loss of resistance was observed 1 mL of " contrast solution was injected. An appropriate epidurogram was noted.  A 4 mL mixture consisting of saline, 1 mL 1% Lidocaine and 80 mg of depomedrol was injected slowly and without resistance.  The needle was flushed with normal saline and removed. The contrast was seen to be displaced after injection. Patient was awake/responsive during all injections.  The patient tolerated the procedure well [facility]  The patient was monitored after the procedure and was given post-procedure and discharge instructions to follow at home. The patient was discharged in a stable condition.    Event Time In   In Facility 0838   In Pre-Procedure 0906   Physician Available    Anesthesia Available    Pre-Op: Bedside Procedure Start    Pre-Op: Bedside Procedure Stop    Pre-Procedure Complete 0938   Out of Pre-Procedure    Anesthesia Start    Anesthesia Start Data Collection    Setup Start    Setup Complete    In Room 1023   Prep Start    Procedure Prep Complete    Procedure Start 1028   Procedure Closing    Emergence    Procedure Finish 1035   Sedation Start 1023   Scope In    Extent Reached    Scope Out    Sedation End 1035   Out of Room 1040   Cleanup Start    Cleanup Complete    Cosmetic Start    Cosmetic Stop    Pain Mgmt In Room    Pain Mgmt Out Room    In Recovery    Anesthesia Finish    Bedside Procedure Start    Bedside Procedure Stop    Recovery Care Complete    Out of Recovery    To Phase II    In Phase II    Pain Mgmt Recovery Start    Pain Mgmt Recovery Stop    Obs Rec Start    Obs Rec Stop    Phase II Care Complete    Out of Phase II    Procedural Care Complete    Discharge    Pain Follow Up Needed    Pain Follow Up Complete

## 2020-12-07 ENCOUNTER — TELEPHONE (OUTPATIENT)
Dept: PAIN MEDICINE | Facility: CLINIC | Age: 65
End: 2020-12-07

## 2020-12-07 ENCOUNTER — OFFICE VISIT (OUTPATIENT)
Dept: PAIN MEDICINE | Facility: CLINIC | Age: 65
End: 2020-12-07
Payer: MEDICARE

## 2020-12-07 VITALS
OXYGEN SATURATION: 95 % | HEIGHT: 68 IN | HEART RATE: 88 BPM | WEIGHT: 239.19 LBS | TEMPERATURE: 98 F | SYSTOLIC BLOOD PRESSURE: 132 MMHG | DIASTOLIC BLOOD PRESSURE: 77 MMHG | BODY MASS INDEX: 36.25 KG/M2

## 2020-12-07 DIAGNOSIS — M54.16 LUMBAR RADICULOPATHY: ICD-10-CM

## 2020-12-07 DIAGNOSIS — G89.4 CHRONIC PAIN DISORDER: ICD-10-CM

## 2020-12-07 DIAGNOSIS — Z98.1 HISTORY OF FUSION OF CERVICAL SPINE: ICD-10-CM

## 2020-12-07 DIAGNOSIS — M48.061 SPINAL STENOSIS OF LUMBAR REGION, UNSPECIFIED WHETHER NEUROGENIC CLAUDICATION PRESENT: ICD-10-CM

## 2020-12-07 DIAGNOSIS — M47.816 LUMBAR SPONDYLOSIS: Primary | ICD-10-CM

## 2020-12-07 PROCEDURE — 99214 PR OFFICE/OUTPT VISIT, EST, LEVL IV, 30-39 MIN: ICD-10-PCS | Mod: S$GLB,,, | Performed by: NURSE PRACTITIONER

## 2020-12-07 PROCEDURE — 99999 PR PBB SHADOW E&M-EST. PATIENT-LVL III: CPT | Mod: PBBFAC,,, | Performed by: NURSE PRACTITIONER

## 2020-12-07 PROCEDURE — 3288F PR FALLS RISK ASSESSMENT DOCUMENTED: ICD-10-PCS | Mod: CPTII,S$GLB,, | Performed by: NURSE PRACTITIONER

## 2020-12-07 PROCEDURE — 1125F AMNT PAIN NOTED PAIN PRSNT: CPT | Mod: S$GLB,,, | Performed by: NURSE PRACTITIONER

## 2020-12-07 PROCEDURE — 3008F PR BODY MASS INDEX (BMI) DOCUMENTED: ICD-10-PCS | Mod: CPTII,S$GLB,, | Performed by: NURSE PRACTITIONER

## 2020-12-07 PROCEDURE — 99214 OFFICE O/P EST MOD 30 MIN: CPT | Mod: S$GLB,,, | Performed by: NURSE PRACTITIONER

## 2020-12-07 PROCEDURE — 1101F PT FALLS ASSESS-DOCD LE1/YR: CPT | Mod: CPTII,S$GLB,, | Performed by: NURSE PRACTITIONER

## 2020-12-07 PROCEDURE — 1101F PR PT FALLS ASSESS DOC 0-1 FALLS W/OUT INJ PAST YR: ICD-10-PCS | Mod: CPTII,S$GLB,, | Performed by: NURSE PRACTITIONER

## 2020-12-07 PROCEDURE — 1125F PR PAIN SEVERITY QUANTIFIED, PAIN PRESENT: ICD-10-PCS | Mod: S$GLB,,, | Performed by: NURSE PRACTITIONER

## 2020-12-07 PROCEDURE — 99999 PR PBB SHADOW E&M-EST. PATIENT-LVL III: ICD-10-PCS | Mod: PBBFAC,,, | Performed by: NURSE PRACTITIONER

## 2020-12-07 PROCEDURE — 3008F BODY MASS INDEX DOCD: CPT | Mod: CPTII,S$GLB,, | Performed by: NURSE PRACTITIONER

## 2020-12-07 PROCEDURE — 3288F FALL RISK ASSESSMENT DOCD: CPT | Mod: CPTII,S$GLB,, | Performed by: NURSE PRACTITIONER

## 2020-12-07 NOTE — TELEPHONE ENCOUNTER
Spoke with patient, patient spoke with wife and would like to proceed with spinal cord stimulator and would like to know what the next step will be and how to proceed.

## 2020-12-07 NOTE — TELEPHONE ENCOUNTER
----- Message from Jenny Hollins sent at 12/7/2020  1:09 PM CST -----  Regarding: concerns about paperwork  Type: Needs Medical Advice  Who Called:  pt  Symptoms (please be specific):  How long has patient had these symptoms:    Pharmacy name and phone #:    Best Call Back Number:   Additional Information: pt seen in the provider office today he stated he was sent home with some paper work, he states he has some concerns pls call to advise

## 2020-12-10 ENCOUNTER — PATIENT MESSAGE (OUTPATIENT)
Dept: PAIN MEDICINE | Facility: CLINIC | Age: 65
End: 2020-12-10

## 2020-12-13 ENCOUNTER — PATIENT MESSAGE (OUTPATIENT)
Dept: NEUROSURGERY | Facility: CLINIC | Age: 65
End: 2020-12-13

## 2021-01-08 ENCOUNTER — OFFICE VISIT (OUTPATIENT)
Dept: UROLOGY | Facility: CLINIC | Age: 66
End: 2021-01-08
Payer: MEDICARE

## 2021-01-08 VITALS — HEIGHT: 68 IN | BODY MASS INDEX: 35.92 KG/M2 | WEIGHT: 237 LBS

## 2021-01-08 DIAGNOSIS — N52.9 IMPOTENCE: ICD-10-CM

## 2021-01-08 DIAGNOSIS — N40.0 BPH WITHOUT URINARY OBSTRUCTION: ICD-10-CM

## 2021-01-08 DIAGNOSIS — Z12.5 SCREENING FOR PROSTATE CANCER: Primary | ICD-10-CM

## 2021-01-08 PROCEDURE — 99999 PR PBB SHADOW E&M-EST. PATIENT-LVL III: ICD-10-PCS | Mod: PBBFAC,,, | Performed by: UROLOGY

## 2021-01-08 PROCEDURE — 1101F PT FALLS ASSESS-DOCD LE1/YR: CPT | Mod: CPTII,S$GLB,, | Performed by: UROLOGY

## 2021-01-08 PROCEDURE — 1126F PR PAIN SEVERITY QUANTIFIED, NO PAIN PRESENT: ICD-10-PCS | Mod: S$GLB,,, | Performed by: UROLOGY

## 2021-01-08 PROCEDURE — 3008F BODY MASS INDEX DOCD: CPT | Mod: CPTII,S$GLB,, | Performed by: UROLOGY

## 2021-01-08 PROCEDURE — 99999 PR PBB SHADOW E&M-EST. PATIENT-LVL III: CPT | Mod: PBBFAC,,, | Performed by: UROLOGY

## 2021-01-08 PROCEDURE — 3288F FALL RISK ASSESSMENT DOCD: CPT | Mod: CPTII,S$GLB,, | Performed by: UROLOGY

## 2021-01-08 PROCEDURE — 3288F PR FALLS RISK ASSESSMENT DOCUMENTED: ICD-10-PCS | Mod: CPTII,S$GLB,, | Performed by: UROLOGY

## 2021-01-08 PROCEDURE — 1101F PR PT FALLS ASSESS DOC 0-1 FALLS W/OUT INJ PAST YR: ICD-10-PCS | Mod: CPTII,S$GLB,, | Performed by: UROLOGY

## 2021-01-08 PROCEDURE — 3008F PR BODY MASS INDEX (BMI) DOCUMENTED: ICD-10-PCS | Mod: CPTII,S$GLB,, | Performed by: UROLOGY

## 2021-01-08 PROCEDURE — 99204 PR OFFICE/OUTPT VISIT, NEW, LEVL IV, 45-59 MIN: ICD-10-PCS | Mod: S$GLB,,, | Performed by: UROLOGY

## 2021-01-08 PROCEDURE — 1126F AMNT PAIN NOTED NONE PRSNT: CPT | Mod: S$GLB,,, | Performed by: UROLOGY

## 2021-01-08 PROCEDURE — 99204 OFFICE O/P NEW MOD 45 MIN: CPT | Mod: S$GLB,,, | Performed by: UROLOGY

## 2021-02-08 ENCOUNTER — OFFICE VISIT (OUTPATIENT)
Dept: UROLOGY | Facility: CLINIC | Age: 66
End: 2021-02-08
Payer: MEDICARE

## 2021-02-08 VITALS — HEIGHT: 68 IN | BODY MASS INDEX: 35.92 KG/M2 | WEIGHT: 237 LBS

## 2021-02-08 DIAGNOSIS — N52.9 IMPOTENCE: Primary | ICD-10-CM

## 2021-02-08 PROCEDURE — 1126F PR PAIN SEVERITY QUANTIFIED, NO PAIN PRESENT: ICD-10-PCS | Mod: S$GLB,,, | Performed by: UROLOGY

## 2021-02-08 PROCEDURE — 99213 PR OFFICE/OUTPT VISIT, EST, LEVL III, 20-29 MIN: ICD-10-PCS | Mod: 25,S$GLB,, | Performed by: UROLOGY

## 2021-02-08 PROCEDURE — 3008F BODY MASS INDEX DOCD: CPT | Mod: CPTII,S$GLB,, | Performed by: UROLOGY

## 2021-02-08 PROCEDURE — 3008F PR BODY MASS INDEX (BMI) DOCUMENTED: ICD-10-PCS | Mod: CPTII,S$GLB,, | Performed by: UROLOGY

## 2021-02-08 PROCEDURE — 1101F PR PT FALLS ASSESS DOC 0-1 FALLS W/OUT INJ PAST YR: ICD-10-PCS | Mod: CPTII,S$GLB,, | Performed by: UROLOGY

## 2021-02-08 PROCEDURE — 54235 PR INJECT CORPORA CAVERN,PHARM AGNT: ICD-10-PCS | Mod: S$GLB,,, | Performed by: UROLOGY

## 2021-02-08 PROCEDURE — 1101F PT FALLS ASSESS-DOCD LE1/YR: CPT | Mod: CPTII,S$GLB,, | Performed by: UROLOGY

## 2021-02-08 PROCEDURE — 54235 NJX CORPORA CAVERNOSA RX AGT: CPT | Mod: S$GLB,,, | Performed by: UROLOGY

## 2021-02-08 PROCEDURE — 3288F FALL RISK ASSESSMENT DOCD: CPT | Mod: CPTII,S$GLB,, | Performed by: UROLOGY

## 2021-02-08 PROCEDURE — 99213 OFFICE O/P EST LOW 20 MIN: CPT | Mod: 25,S$GLB,, | Performed by: UROLOGY

## 2021-02-08 PROCEDURE — 3288F PR FALLS RISK ASSESSMENT DOCUMENTED: ICD-10-PCS | Mod: CPTII,S$GLB,, | Performed by: UROLOGY

## 2021-02-08 PROCEDURE — 99999 PR PBB SHADOW E&M-EST. PATIENT-LVL III: ICD-10-PCS | Mod: PBBFAC,,, | Performed by: UROLOGY

## 2021-02-08 PROCEDURE — 99999 PR PBB SHADOW E&M-EST. PATIENT-LVL III: CPT | Mod: PBBFAC,,, | Performed by: UROLOGY

## 2021-02-08 PROCEDURE — 1126F AMNT PAIN NOTED NONE PRSNT: CPT | Mod: S$GLB,,, | Performed by: UROLOGY

## 2021-03-09 ENCOUNTER — TELEPHONE (OUTPATIENT)
Dept: NEUROSURGERY | Facility: CLINIC | Age: 66
End: 2021-03-09

## 2021-07-08 ENCOUNTER — TELEPHONE (OUTPATIENT)
Dept: PAIN MEDICINE | Facility: CLINIC | Age: 66
End: 2021-07-08

## 2021-07-27 ENCOUNTER — OFFICE VISIT (OUTPATIENT)
Dept: PAIN MEDICINE | Facility: CLINIC | Age: 66
End: 2021-07-27
Payer: MEDICARE

## 2021-07-27 VITALS
BODY MASS INDEX: 35.97 KG/M2 | WEIGHT: 237.31 LBS | SYSTOLIC BLOOD PRESSURE: 144 MMHG | HEIGHT: 68 IN | DIASTOLIC BLOOD PRESSURE: 78 MMHG | HEART RATE: 78 BPM | OXYGEN SATURATION: 97 %

## 2021-07-27 DIAGNOSIS — M54.41 CHRONIC BILATERAL LOW BACK PAIN WITH BILATERAL SCIATICA: ICD-10-CM

## 2021-07-27 DIAGNOSIS — M54.42 CHRONIC BILATERAL LOW BACK PAIN WITH BILATERAL SCIATICA: ICD-10-CM

## 2021-07-27 DIAGNOSIS — M54.16 LUMBAR RADICULOPATHY: Primary | ICD-10-CM

## 2021-07-27 DIAGNOSIS — G89.29 CHRONIC BILATERAL LOW BACK PAIN WITH BILATERAL SCIATICA: ICD-10-CM

## 2021-07-27 PROCEDURE — 99214 OFFICE O/P EST MOD 30 MIN: CPT | Mod: S$GLB,,, | Performed by: ANESTHESIOLOGY

## 2021-07-27 PROCEDURE — 1101F PR PT FALLS ASSESS DOC 0-1 FALLS W/OUT INJ PAST YR: ICD-10-PCS | Mod: CPTII,S$GLB,, | Performed by: ANESTHESIOLOGY

## 2021-07-27 PROCEDURE — 1125F PR PAIN SEVERITY QUANTIFIED, PAIN PRESENT: ICD-10-PCS | Mod: CPTII,S$GLB,, | Performed by: ANESTHESIOLOGY

## 2021-07-27 PROCEDURE — 3008F BODY MASS INDEX DOCD: CPT | Mod: CPTII,S$GLB,, | Performed by: ANESTHESIOLOGY

## 2021-07-27 PROCEDURE — 3288F FALL RISK ASSESSMENT DOCD: CPT | Mod: CPTII,S$GLB,, | Performed by: ANESTHESIOLOGY

## 2021-07-27 PROCEDURE — 99999 PR PBB SHADOW E&M-EST. PATIENT-LVL III: CPT | Mod: PBBFAC,,, | Performed by: ANESTHESIOLOGY

## 2021-07-27 PROCEDURE — 3008F PR BODY MASS INDEX (BMI) DOCUMENTED: ICD-10-PCS | Mod: CPTII,S$GLB,, | Performed by: ANESTHESIOLOGY

## 2021-07-27 PROCEDURE — 1159F PR MEDICATION LIST DOCUMENTED IN MEDICAL RECORD: ICD-10-PCS | Mod: CPTII,S$GLB,, | Performed by: ANESTHESIOLOGY

## 2021-07-27 PROCEDURE — 1160F RVW MEDS BY RX/DR IN RCRD: CPT | Mod: CPTII,S$GLB,, | Performed by: ANESTHESIOLOGY

## 2021-07-27 PROCEDURE — 1159F MED LIST DOCD IN RCRD: CPT | Mod: CPTII,S$GLB,, | Performed by: ANESTHESIOLOGY

## 2021-07-27 PROCEDURE — 99214 PR OFFICE/OUTPT VISIT, EST, LEVL IV, 30-39 MIN: ICD-10-PCS | Mod: S$GLB,,, | Performed by: ANESTHESIOLOGY

## 2021-07-27 PROCEDURE — 1125F AMNT PAIN NOTED PAIN PRSNT: CPT | Mod: CPTII,S$GLB,, | Performed by: ANESTHESIOLOGY

## 2021-07-27 PROCEDURE — 3288F PR FALLS RISK ASSESSMENT DOCUMENTED: ICD-10-PCS | Mod: CPTII,S$GLB,, | Performed by: ANESTHESIOLOGY

## 2021-07-27 PROCEDURE — 99999 PR PBB SHADOW E&M-EST. PATIENT-LVL III: ICD-10-PCS | Mod: PBBFAC,,, | Performed by: ANESTHESIOLOGY

## 2021-07-27 PROCEDURE — 1160F PR REVIEW ALL MEDS BY PRESCRIBER/CLIN PHARMACIST DOCUMENTED: ICD-10-PCS | Mod: CPTII,S$GLB,, | Performed by: ANESTHESIOLOGY

## 2021-07-27 PROCEDURE — 1101F PT FALLS ASSESS-DOCD LE1/YR: CPT | Mod: CPTII,S$GLB,, | Performed by: ANESTHESIOLOGY

## 2021-07-27 RX ORDER — ALPRAZOLAM 0.5 MG/1
1 TABLET, ORALLY DISINTEGRATING ORAL ONCE AS NEEDED
Status: CANCELLED | OUTPATIENT
Start: 2021-08-04 | End: 2032-12-30

## 2021-07-30 ENCOUNTER — TELEPHONE (OUTPATIENT)
Dept: PAIN MEDICINE | Facility: CLINIC | Age: 66
End: 2021-07-30

## 2021-07-30 DIAGNOSIS — Z01.818 PRE-OP TESTING: ICD-10-CM

## 2021-08-03 DIAGNOSIS — M54.16 LUMBAR RADICULOPATHY: Primary | ICD-10-CM

## 2021-08-04 ENCOUNTER — HOSPITAL ENCOUNTER (OUTPATIENT)
Facility: HOSPITAL | Age: 66
Discharge: HOME | End: 2021-08-04
Attending: ANESTHESIOLOGY | Admitting: ANESTHESIOLOGY
Payer: MEDICARE

## 2021-08-04 ENCOUNTER — HOSPITAL ENCOUNTER (OUTPATIENT)
Dept: RADIOLOGY | Facility: HOSPITAL | Age: 66
Discharge: HOME | End: 2021-08-04
Attending: ANESTHESIOLOGY
Payer: MEDICARE

## 2021-08-04 VITALS
SYSTOLIC BLOOD PRESSURE: 138 MMHG | TEMPERATURE: 97 F | WEIGHT: 230 LBS | HEART RATE: 74 BPM | HEIGHT: 68 IN | DIASTOLIC BLOOD PRESSURE: 74 MMHG | OXYGEN SATURATION: 98 % | BODY MASS INDEX: 34.86 KG/M2 | RESPIRATION RATE: 16 BRPM

## 2021-08-04 DIAGNOSIS — M54.16 LUMBAR RADICULOPATHY: ICD-10-CM

## 2021-08-04 DIAGNOSIS — M54.16 LUMBAR RADICULOPATHY: Primary | ICD-10-CM

## 2021-08-04 PROCEDURE — 62323 NJX INTERLAMINAR LMBR/SAC: CPT | Mod: ,,, | Performed by: ANESTHESIOLOGY

## 2021-08-04 PROCEDURE — 62323 PR INJ LUMBAR/SACRAL, W/IMAGING GUIDANCE: ICD-10-PCS | Mod: ,,, | Performed by: ANESTHESIOLOGY

## 2021-08-04 PROCEDURE — 62323 NJX INTERLAMINAR LMBR/SAC: CPT | Mod: PO | Performed by: ANESTHESIOLOGY

## 2021-08-04 PROCEDURE — 76000 FLUOROSCOPY <1 HR PHYS/QHP: CPT | Mod: TC,PO

## 2021-08-04 PROCEDURE — A4216 STERILE WATER/SALINE, 10 ML: HCPCS | Mod: PO | Performed by: ANESTHESIOLOGY

## 2021-08-04 PROCEDURE — 25000003 PHARM REV CODE 250: Mod: PO | Performed by: ANESTHESIOLOGY

## 2021-08-04 PROCEDURE — 25500020 PHARM REV CODE 255: Mod: PO | Performed by: ANESTHESIOLOGY

## 2021-08-04 PROCEDURE — 63600175 PHARM REV CODE 636 W HCPCS: Mod: PO | Performed by: ANESTHESIOLOGY

## 2021-08-04 RX ORDER — TRIAMCINOLONE ACETONIDE 40 MG/ML
INJECTION, SUSPENSION INTRA-ARTICULAR; INTRAMUSCULAR
Status: DISCONTINUED | OUTPATIENT
Start: 2021-08-04 | End: 2021-08-04 | Stop reason: HOSPADM

## 2021-08-04 RX ORDER — LIDOCAINE HYDROCHLORIDE 10 MG/ML
INJECTION, SOLUTION EPIDURAL; INFILTRATION; INTRACAUDAL; PERINEURAL
Status: DISCONTINUED | OUTPATIENT
Start: 2021-08-04 | End: 2021-08-04 | Stop reason: HOSPADM

## 2021-08-04 RX ORDER — SODIUM CHLORIDE 9 MG/ML
INJECTION, SOLUTION INTRAMUSCULAR; INTRAVENOUS; SUBCUTANEOUS
Status: DISCONTINUED | OUTPATIENT
Start: 2021-08-04 | End: 2021-08-04 | Stop reason: HOSPADM

## 2021-08-04 RX ORDER — ALPRAZOLAM 0.5 MG/1
1 TABLET, ORALLY DISINTEGRATING ORAL ONCE AS NEEDED
Status: DISCONTINUED | OUTPATIENT
Start: 2021-08-04 | End: 2021-08-04 | Stop reason: HOSPADM

## 2021-08-06 ENCOUNTER — OFFICE VISIT (OUTPATIENT)
Dept: NEUROSURGERY | Facility: CLINIC | Age: 66
End: 2021-08-06
Payer: MEDICARE

## 2021-08-06 VITALS
DIASTOLIC BLOOD PRESSURE: 93 MMHG | RESPIRATION RATE: 18 BRPM | HEIGHT: 68 IN | SYSTOLIC BLOOD PRESSURE: 150 MMHG | BODY MASS INDEX: 34.85 KG/M2 | HEART RATE: 71 BPM | WEIGHT: 229.94 LBS

## 2021-08-06 DIAGNOSIS — R29.898 LEG WEAKNESS, BILATERAL: ICD-10-CM

## 2021-08-06 DIAGNOSIS — G89.29 CHRONIC BILATERAL LOW BACK PAIN WITH BILATERAL SCIATICA: Primary | ICD-10-CM

## 2021-08-06 DIAGNOSIS — M54.42 CHRONIC BILATERAL LOW BACK PAIN WITH BILATERAL SCIATICA: Primary | ICD-10-CM

## 2021-08-06 DIAGNOSIS — M54.41 CHRONIC BILATERAL LOW BACK PAIN WITH BILATERAL SCIATICA: Primary | ICD-10-CM

## 2021-08-06 DIAGNOSIS — R26.9 GAIT DIFFICULTY: ICD-10-CM

## 2021-08-06 DIAGNOSIS — Z98.1 S/P CERVICAL SPINAL FUSION: ICD-10-CM

## 2021-08-06 PROCEDURE — 3288F PR FALLS RISK ASSESSMENT DOCUMENTED: ICD-10-PCS | Mod: CPTII,S$GLB,, | Performed by: PHYSICIAN ASSISTANT

## 2021-08-06 PROCEDURE — 3080F PR MOST RECENT DIASTOLIC BLOOD PRESSURE >= 90 MM HG: ICD-10-PCS | Mod: CPTII,S$GLB,, | Performed by: PHYSICIAN ASSISTANT

## 2021-08-06 PROCEDURE — 3008F BODY MASS INDEX DOCD: CPT | Mod: CPTII,S$GLB,, | Performed by: PHYSICIAN ASSISTANT

## 2021-08-06 PROCEDURE — 3288F FALL RISK ASSESSMENT DOCD: CPT | Mod: CPTII,S$GLB,, | Performed by: PHYSICIAN ASSISTANT

## 2021-08-06 PROCEDURE — 3077F PR MOST RECENT SYSTOLIC BLOOD PRESSURE >= 140 MM HG: ICD-10-PCS | Mod: CPTII,S$GLB,, | Performed by: PHYSICIAN ASSISTANT

## 2021-08-06 PROCEDURE — 1160F PR REVIEW ALL MEDS BY PRESCRIBER/CLIN PHARMACIST DOCUMENTED: ICD-10-PCS | Mod: CPTII,S$GLB,, | Performed by: PHYSICIAN ASSISTANT

## 2021-08-06 PROCEDURE — 1159F PR MEDICATION LIST DOCUMENTED IN MEDICAL RECORD: ICD-10-PCS | Mod: CPTII,S$GLB,, | Performed by: PHYSICIAN ASSISTANT

## 2021-08-06 PROCEDURE — 1159F MED LIST DOCD IN RCRD: CPT | Mod: CPTII,S$GLB,, | Performed by: PHYSICIAN ASSISTANT

## 2021-08-06 PROCEDURE — 1125F PR PAIN SEVERITY QUANTIFIED, PAIN PRESENT: ICD-10-PCS | Mod: CPTII,S$GLB,, | Performed by: PHYSICIAN ASSISTANT

## 2021-08-06 PROCEDURE — 1160F RVW MEDS BY RX/DR IN RCRD: CPT | Mod: CPTII,S$GLB,, | Performed by: PHYSICIAN ASSISTANT

## 2021-08-06 PROCEDURE — 3077F SYST BP >= 140 MM HG: CPT | Mod: CPTII,S$GLB,, | Performed by: PHYSICIAN ASSISTANT

## 2021-08-06 PROCEDURE — 1101F PT FALLS ASSESS-DOCD LE1/YR: CPT | Mod: CPTII,S$GLB,, | Performed by: PHYSICIAN ASSISTANT

## 2021-08-06 PROCEDURE — 1125F AMNT PAIN NOTED PAIN PRSNT: CPT | Mod: CPTII,S$GLB,, | Performed by: PHYSICIAN ASSISTANT

## 2021-08-06 PROCEDURE — 3080F DIAST BP >= 90 MM HG: CPT | Mod: CPTII,S$GLB,, | Performed by: PHYSICIAN ASSISTANT

## 2021-08-06 PROCEDURE — 1101F PR PT FALLS ASSESS DOC 0-1 FALLS W/OUT INJ PAST YR: ICD-10-PCS | Mod: CPTII,S$GLB,, | Performed by: PHYSICIAN ASSISTANT

## 2021-08-06 PROCEDURE — 99999 PR PBB SHADOW E&M-EST. PATIENT-LVL IV: CPT | Mod: PBBFAC,,, | Performed by: PHYSICIAN ASSISTANT

## 2021-08-06 PROCEDURE — 99214 PR OFFICE/OUTPT VISIT, EST, LEVL IV, 30-39 MIN: ICD-10-PCS | Mod: S$GLB,,, | Performed by: PHYSICIAN ASSISTANT

## 2021-08-06 PROCEDURE — 99999 PR PBB SHADOW E&M-EST. PATIENT-LVL IV: ICD-10-PCS | Mod: PBBFAC,,, | Performed by: PHYSICIAN ASSISTANT

## 2021-08-06 PROCEDURE — 3008F PR BODY MASS INDEX (BMI) DOCUMENTED: ICD-10-PCS | Mod: CPTII,S$GLB,, | Performed by: PHYSICIAN ASSISTANT

## 2021-08-06 PROCEDURE — 99214 OFFICE O/P EST MOD 30 MIN: CPT | Mod: S$GLB,,, | Performed by: PHYSICIAN ASSISTANT

## 2021-08-23 ENCOUNTER — OFFICE VISIT (OUTPATIENT)
Dept: PAIN MEDICINE | Facility: CLINIC | Age: 66
End: 2021-08-23
Payer: MEDICARE

## 2021-08-23 VITALS
WEIGHT: 238.13 LBS | HEART RATE: 88 BPM | RESPIRATION RATE: 20 BRPM | SYSTOLIC BLOOD PRESSURE: 131 MMHG | TEMPERATURE: 97 F | OXYGEN SATURATION: 97 % | DIASTOLIC BLOOD PRESSURE: 73 MMHG | BODY MASS INDEX: 36.2 KG/M2

## 2021-08-23 DIAGNOSIS — M54.50 CHRONIC BILATERAL LOW BACK PAIN, UNSPECIFIED WHETHER SCIATICA PRESENT: ICD-10-CM

## 2021-08-23 DIAGNOSIS — G89.29 CHRONIC BILATERAL LOW BACK PAIN, UNSPECIFIED WHETHER SCIATICA PRESENT: ICD-10-CM

## 2021-08-23 DIAGNOSIS — M47.816 LUMBAR SPONDYLOSIS: Primary | ICD-10-CM

## 2021-08-23 PROCEDURE — 3075F SYST BP GE 130 - 139MM HG: CPT | Mod: CPTII,S$GLB,, | Performed by: ANESTHESIOLOGY

## 2021-08-23 PROCEDURE — 99214 PR OFFICE/OUTPT VISIT, EST, LEVL IV, 30-39 MIN: ICD-10-PCS | Mod: S$GLB,,, | Performed by: ANESTHESIOLOGY

## 2021-08-23 PROCEDURE — 1160F RVW MEDS BY RX/DR IN RCRD: CPT | Mod: CPTII,S$GLB,, | Performed by: ANESTHESIOLOGY

## 2021-08-23 PROCEDURE — 1160F PR REVIEW ALL MEDS BY PRESCRIBER/CLIN PHARMACIST DOCUMENTED: ICD-10-PCS | Mod: CPTII,S$GLB,, | Performed by: ANESTHESIOLOGY

## 2021-08-23 PROCEDURE — 3078F DIAST BP <80 MM HG: CPT | Mod: CPTII,S$GLB,, | Performed by: ANESTHESIOLOGY

## 2021-08-23 PROCEDURE — 3008F PR BODY MASS INDEX (BMI) DOCUMENTED: ICD-10-PCS | Mod: CPTII,S$GLB,, | Performed by: ANESTHESIOLOGY

## 2021-08-23 PROCEDURE — 1159F MED LIST DOCD IN RCRD: CPT | Mod: CPTII,S$GLB,, | Performed by: ANESTHESIOLOGY

## 2021-08-23 PROCEDURE — 1159F PR MEDICATION LIST DOCUMENTED IN MEDICAL RECORD: ICD-10-PCS | Mod: CPTII,S$GLB,, | Performed by: ANESTHESIOLOGY

## 2021-08-23 PROCEDURE — 1101F PR PT FALLS ASSESS DOC 0-1 FALLS W/OUT INJ PAST YR: ICD-10-PCS | Mod: CPTII,S$GLB,, | Performed by: ANESTHESIOLOGY

## 2021-08-23 PROCEDURE — 3288F FALL RISK ASSESSMENT DOCD: CPT | Mod: CPTII,S$GLB,, | Performed by: ANESTHESIOLOGY

## 2021-08-23 PROCEDURE — 3288F PR FALLS RISK ASSESSMENT DOCUMENTED: ICD-10-PCS | Mod: CPTII,S$GLB,, | Performed by: ANESTHESIOLOGY

## 2021-08-23 PROCEDURE — 1125F PR PAIN SEVERITY QUANTIFIED, PAIN PRESENT: ICD-10-PCS | Mod: CPTII,S$GLB,, | Performed by: ANESTHESIOLOGY

## 2021-08-23 PROCEDURE — 99999 PR PBB SHADOW E&M-EST. PATIENT-LVL III: ICD-10-PCS | Mod: PBBFAC,,, | Performed by: ANESTHESIOLOGY

## 2021-08-23 PROCEDURE — 99999 PR PBB SHADOW E&M-EST. PATIENT-LVL III: CPT | Mod: PBBFAC,,, | Performed by: ANESTHESIOLOGY

## 2021-08-23 PROCEDURE — 3008F BODY MASS INDEX DOCD: CPT | Mod: CPTII,S$GLB,, | Performed by: ANESTHESIOLOGY

## 2021-08-23 PROCEDURE — 1101F PT FALLS ASSESS-DOCD LE1/YR: CPT | Mod: CPTII,S$GLB,, | Performed by: ANESTHESIOLOGY

## 2021-08-23 PROCEDURE — 3078F PR MOST RECENT DIASTOLIC BLOOD PRESSURE < 80 MM HG: ICD-10-PCS | Mod: CPTII,S$GLB,, | Performed by: ANESTHESIOLOGY

## 2021-08-23 PROCEDURE — 3075F PR MOST RECENT SYSTOLIC BLOOD PRESS GE 130-139MM HG: ICD-10-PCS | Mod: CPTII,S$GLB,, | Performed by: ANESTHESIOLOGY

## 2021-08-23 PROCEDURE — 99214 OFFICE O/P EST MOD 30 MIN: CPT | Mod: S$GLB,,, | Performed by: ANESTHESIOLOGY

## 2021-08-23 PROCEDURE — 1125F AMNT PAIN NOTED PAIN PRSNT: CPT | Mod: CPTII,S$GLB,, | Performed by: ANESTHESIOLOGY

## 2021-08-26 ENCOUNTER — HOSPITAL ENCOUNTER (OUTPATIENT)
Dept: RADIOLOGY | Facility: HOSPITAL | Age: 66
Discharge: HOME | End: 2021-08-26
Attending: PHYSICIAN ASSISTANT
Payer: MEDICARE

## 2021-08-26 ENCOUNTER — OFFICE VISIT (OUTPATIENT)
Dept: NEUROSURGERY | Facility: CLINIC | Age: 66
End: 2021-08-26
Payer: MEDICARE

## 2021-08-26 VITALS
SYSTOLIC BLOOD PRESSURE: 135 MMHG | HEIGHT: 68 IN | RESPIRATION RATE: 18 BRPM | BODY MASS INDEX: 36.09 KG/M2 | HEART RATE: 85 BPM | DIASTOLIC BLOOD PRESSURE: 89 MMHG | WEIGHT: 238.13 LBS

## 2021-08-26 DIAGNOSIS — M54.42 CHRONIC BILATERAL LOW BACK PAIN WITH BILATERAL SCIATICA: ICD-10-CM

## 2021-08-26 DIAGNOSIS — M54.41 CHRONIC BILATERAL LOW BACK PAIN WITH BILATERAL SCIATICA: ICD-10-CM

## 2021-08-26 DIAGNOSIS — R29.898 LEG WEAKNESS, BILATERAL: ICD-10-CM

## 2021-08-26 DIAGNOSIS — M43.27 FUSION OF SPINE, LUMBOSACRAL REGION: ICD-10-CM

## 2021-08-26 DIAGNOSIS — M51.36 DDD (DEGENERATIVE DISC DISEASE), LUMBAR: ICD-10-CM

## 2021-08-26 DIAGNOSIS — Z98.1 S/P CERVICAL SPINAL FUSION: ICD-10-CM

## 2021-08-26 DIAGNOSIS — M51.9 FORAMINAL STENOSIS DUE TO INTERVERTEBRAL DISC DISEASE: ICD-10-CM

## 2021-08-26 DIAGNOSIS — G89.29 CHRONIC BILATERAL LOW BACK PAIN WITH BILATERAL SCIATICA: ICD-10-CM

## 2021-08-26 DIAGNOSIS — R26.9 GAIT DIFFICULTY: ICD-10-CM

## 2021-08-26 DIAGNOSIS — M99.79 FORAMINAL STENOSIS DUE TO INTERVERTEBRAL DISC DISEASE: ICD-10-CM

## 2021-08-26 DIAGNOSIS — M43.10 RETROLISTHESIS OF VERTEBRAE: Primary | ICD-10-CM

## 2021-08-26 PROCEDURE — 1125F PR PAIN SEVERITY QUANTIFIED, PAIN PRESENT: ICD-10-PCS | Mod: CPTII,S$GLB,, | Performed by: STUDENT IN AN ORGANIZED HEALTH CARE EDUCATION/TRAINING PROGRAM

## 2021-08-26 PROCEDURE — 3075F PR MOST RECENT SYSTOLIC BLOOD PRESS GE 130-139MM HG: ICD-10-PCS | Mod: CPTII,S$GLB,, | Performed by: STUDENT IN AN ORGANIZED HEALTH CARE EDUCATION/TRAINING PROGRAM

## 2021-08-26 PROCEDURE — 3288F FALL RISK ASSESSMENT DOCD: CPT | Mod: CPTII,S$GLB,, | Performed by: STUDENT IN AN ORGANIZED HEALTH CARE EDUCATION/TRAINING PROGRAM

## 2021-08-26 PROCEDURE — 1159F MED LIST DOCD IN RCRD: CPT | Mod: CPTII,S$GLB,, | Performed by: STUDENT IN AN ORGANIZED HEALTH CARE EDUCATION/TRAINING PROGRAM

## 2021-08-26 PROCEDURE — 72148 MRI LUMBAR SPINE W/O DYE: CPT | Mod: TC,PO

## 2021-08-26 PROCEDURE — 72040 XR CERVICAL SPINE AP LATERAL: ICD-10-PCS | Mod: 26,,, | Performed by: RADIOLOGY

## 2021-08-26 PROCEDURE — 99215 OFFICE O/P EST HI 40 MIN: CPT | Mod: S$GLB,,, | Performed by: STUDENT IN AN ORGANIZED HEALTH CARE EDUCATION/TRAINING PROGRAM

## 2021-08-26 PROCEDURE — 3008F BODY MASS INDEX DOCD: CPT | Mod: CPTII,S$GLB,, | Performed by: STUDENT IN AN ORGANIZED HEALTH CARE EDUCATION/TRAINING PROGRAM

## 2021-08-26 PROCEDURE — 1125F AMNT PAIN NOTED PAIN PRSNT: CPT | Mod: CPTII,S$GLB,, | Performed by: STUDENT IN AN ORGANIZED HEALTH CARE EDUCATION/TRAINING PROGRAM

## 2021-08-26 PROCEDURE — 99999 PR PBB SHADOW E&M-EST. PATIENT-LVL III: CPT | Mod: PBBFAC,,, | Performed by: STUDENT IN AN ORGANIZED HEALTH CARE EDUCATION/TRAINING PROGRAM

## 2021-08-26 PROCEDURE — 99999 PR PBB SHADOW E&M-EST. PATIENT-LVL III: ICD-10-PCS | Mod: PBBFAC,,, | Performed by: STUDENT IN AN ORGANIZED HEALTH CARE EDUCATION/TRAINING PROGRAM

## 2021-08-26 PROCEDURE — 3288F PR FALLS RISK ASSESSMENT DOCUMENTED: ICD-10-PCS | Mod: CPTII,S$GLB,, | Performed by: STUDENT IN AN ORGANIZED HEALTH CARE EDUCATION/TRAINING PROGRAM

## 2021-08-26 PROCEDURE — 99215 PR OFFICE/OUTPT VISIT, EST, LEVL V, 40-54 MIN: ICD-10-PCS | Mod: S$GLB,,, | Performed by: STUDENT IN AN ORGANIZED HEALTH CARE EDUCATION/TRAINING PROGRAM

## 2021-08-26 PROCEDURE — 1101F PR PT FALLS ASSESS DOC 0-1 FALLS W/OUT INJ PAST YR: ICD-10-PCS | Mod: CPTII,S$GLB,, | Performed by: STUDENT IN AN ORGANIZED HEALTH CARE EDUCATION/TRAINING PROGRAM

## 2021-08-26 PROCEDURE — 3075F SYST BP GE 130 - 139MM HG: CPT | Mod: CPTII,S$GLB,, | Performed by: STUDENT IN AN ORGANIZED HEALTH CARE EDUCATION/TRAINING PROGRAM

## 2021-08-26 PROCEDURE — 72040 X-RAY EXAM NECK SPINE 2-3 VW: CPT | Mod: TC,FY,PO

## 2021-08-26 PROCEDURE — 72040 X-RAY EXAM NECK SPINE 2-3 VW: CPT | Mod: 26,,, | Performed by: RADIOLOGY

## 2021-08-26 PROCEDURE — 3079F DIAST BP 80-89 MM HG: CPT | Mod: CPTII,S$GLB,, | Performed by: STUDENT IN AN ORGANIZED HEALTH CARE EDUCATION/TRAINING PROGRAM

## 2021-08-26 PROCEDURE — 3079F PR MOST RECENT DIASTOLIC BLOOD PRESSURE 80-89 MM HG: ICD-10-PCS | Mod: CPTII,S$GLB,, | Performed by: STUDENT IN AN ORGANIZED HEALTH CARE EDUCATION/TRAINING PROGRAM

## 2021-08-26 PROCEDURE — 1159F PR MEDICATION LIST DOCUMENTED IN MEDICAL RECORD: ICD-10-PCS | Mod: CPTII,S$GLB,, | Performed by: STUDENT IN AN ORGANIZED HEALTH CARE EDUCATION/TRAINING PROGRAM

## 2021-08-26 PROCEDURE — 72110 XR LUMBAR SPINE AP AND LAT WITH FLEX/EXT: ICD-10-PCS | Mod: 26,,, | Performed by: RADIOLOGY

## 2021-08-26 PROCEDURE — 1101F PT FALLS ASSESS-DOCD LE1/YR: CPT | Mod: CPTII,S$GLB,, | Performed by: STUDENT IN AN ORGANIZED HEALTH CARE EDUCATION/TRAINING PROGRAM

## 2021-08-26 PROCEDURE — 72110 X-RAY EXAM L-2 SPINE 4/>VWS: CPT | Mod: 26,,, | Performed by: RADIOLOGY

## 2021-08-26 PROCEDURE — 72148 MRI LUMBAR SPINE WITHOUT CONTRAST: ICD-10-PCS | Mod: 26,,, | Performed by: RADIOLOGY

## 2021-08-26 PROCEDURE — 3008F PR BODY MASS INDEX (BMI) DOCUMENTED: ICD-10-PCS | Mod: CPTII,S$GLB,, | Performed by: STUDENT IN AN ORGANIZED HEALTH CARE EDUCATION/TRAINING PROGRAM

## 2021-08-26 PROCEDURE — 72110 X-RAY EXAM L-2 SPINE 4/>VWS: CPT | Mod: TC,FY,PO

## 2021-08-26 PROCEDURE — 72148 MRI LUMBAR SPINE W/O DYE: CPT | Mod: 26,,, | Performed by: RADIOLOGY

## 2021-09-07 ENCOUNTER — TELEPHONE (OUTPATIENT)
Dept: NEUROSURGERY | Facility: CLINIC | Age: 66
End: 2021-09-07

## 2021-09-08 ENCOUNTER — HOSPITAL ENCOUNTER (OUTPATIENT)
Dept: RADIOLOGY | Facility: HOSPITAL | Age: 66
Discharge: HOME | End: 2021-09-08
Attending: STUDENT IN AN ORGANIZED HEALTH CARE EDUCATION/TRAINING PROGRAM
Payer: MEDICARE

## 2021-09-08 DIAGNOSIS — M43.27 FUSION OF SPINE, LUMBOSACRAL REGION: ICD-10-CM

## 2021-09-08 PROCEDURE — 72131 CT LUMBAR SPINE W/O DYE: CPT | Mod: TC,PO

## 2021-09-08 PROCEDURE — 72082 X-RAY EXAM ENTIRE SPI 2/3 VW: CPT | Mod: 26,,, | Performed by: RADIOLOGY

## 2021-09-08 PROCEDURE — 72082 XR SCOLIOSIS COMPLETE: ICD-10-PCS | Mod: 26,,, | Performed by: RADIOLOGY

## 2021-09-08 PROCEDURE — 72131 CT LUMBAR SPINE W/O DYE: CPT | Mod: 26,,, | Performed by: RADIOLOGY

## 2021-09-08 PROCEDURE — 72131 CT LUMBAR SPINE WITHOUT CONTRAST: ICD-10-PCS | Mod: 26,,, | Performed by: RADIOLOGY

## 2021-09-08 PROCEDURE — 72082 X-RAY EXAM ENTIRE SPI 2/3 VW: CPT | Mod: TC,FY,PO

## 2021-10-27 ENCOUNTER — OFFICE VISIT (OUTPATIENT)
Dept: NEUROSURGERY | Facility: CLINIC | Age: 66
End: 2021-10-27
Payer: MEDICARE

## 2021-10-27 ENCOUNTER — OFFICE VISIT (OUTPATIENT)
Dept: OPTOMETRY | Facility: CLINIC | Age: 66
End: 2021-10-27
Payer: MEDICARE

## 2021-10-27 VITALS — SYSTOLIC BLOOD PRESSURE: 148 MMHG | HEART RATE: 90 BPM | DIASTOLIC BLOOD PRESSURE: 90 MMHG

## 2021-10-27 DIAGNOSIS — M43.10 RETROLISTHESIS OF VERTEBRAE: Primary | ICD-10-CM

## 2021-10-27 DIAGNOSIS — H25.13 NUCLEAR SCLEROSIS OF BOTH EYES: Primary | ICD-10-CM

## 2021-10-27 DIAGNOSIS — M51.36 DDD (DEGENERATIVE DISC DISEASE), LUMBAR: ICD-10-CM

## 2021-10-27 DIAGNOSIS — M51.9 FORAMINAL STENOSIS DUE TO INTERVERTEBRAL DISC DISEASE: ICD-10-CM

## 2021-10-27 DIAGNOSIS — M41.50 SCOLIOSIS DUE TO DEGENERATIVE DISEASE OF SPINE IN ADULT PATIENT: ICD-10-CM

## 2021-10-27 DIAGNOSIS — M99.79 FORAMINAL STENOSIS DUE TO INTERVERTEBRAL DISC DISEASE: ICD-10-CM

## 2021-10-27 DIAGNOSIS — R26.9 GAIT DIFFICULTY: ICD-10-CM

## 2021-10-27 DIAGNOSIS — M21.371 FOOT DROP, RIGHT: ICD-10-CM

## 2021-10-27 DIAGNOSIS — H52.7 REFRACTIVE ERROR: ICD-10-CM

## 2021-10-27 PROCEDURE — 1160F PR REVIEW ALL MEDS BY PRESCRIBER/CLIN PHARMACIST DOCUMENTED: ICD-10-PCS | Mod: CPTII,S$GLB,, | Performed by: OPTOMETRIST

## 2021-10-27 PROCEDURE — 99215 PR OFFICE/OUTPT VISIT, EST, LEVL V, 40-54 MIN: ICD-10-PCS | Mod: S$GLB,,, | Performed by: STUDENT IN AN ORGANIZED HEALTH CARE EDUCATION/TRAINING PROGRAM

## 2021-10-27 PROCEDURE — 1101F PR PT FALLS ASSESS DOC 0-1 FALLS W/OUT INJ PAST YR: ICD-10-PCS | Mod: CPTII,S$GLB,, | Performed by: STUDENT IN AN ORGANIZED HEALTH CARE EDUCATION/TRAINING PROGRAM

## 2021-10-27 PROCEDURE — 1101F PT FALLS ASSESS-DOCD LE1/YR: CPT | Mod: CPTII,S$GLB,, | Performed by: STUDENT IN AN ORGANIZED HEALTH CARE EDUCATION/TRAINING PROGRAM

## 2021-10-27 PROCEDURE — 1159F MED LIST DOCD IN RCRD: CPT | Mod: CPTII,S$GLB,, | Performed by: OPTOMETRIST

## 2021-10-27 PROCEDURE — 3288F FALL RISK ASSESSMENT DOCD: CPT | Mod: CPTII,S$GLB,, | Performed by: OPTOMETRIST

## 2021-10-27 PROCEDURE — 1101F PT FALLS ASSESS-DOCD LE1/YR: CPT | Mod: CPTII,S$GLB,, | Performed by: OPTOMETRIST

## 2021-10-27 PROCEDURE — 1126F AMNT PAIN NOTED NONE PRSNT: CPT | Mod: CPTII,S$GLB,, | Performed by: STUDENT IN AN ORGANIZED HEALTH CARE EDUCATION/TRAINING PROGRAM

## 2021-10-27 PROCEDURE — 1126F PR PAIN SEVERITY QUANTIFIED, NO PAIN PRESENT: ICD-10-PCS | Mod: CPTII,S$GLB,, | Performed by: OPTOMETRIST

## 2021-10-27 PROCEDURE — 3080F PR MOST RECENT DIASTOLIC BLOOD PRESSURE >= 90 MM HG: ICD-10-PCS | Mod: CPTII,S$GLB,, | Performed by: STUDENT IN AN ORGANIZED HEALTH CARE EDUCATION/TRAINING PROGRAM

## 2021-10-27 PROCEDURE — 1159F PR MEDICATION LIST DOCUMENTED IN MEDICAL RECORD: ICD-10-PCS | Mod: CPTII,S$GLB,, | Performed by: OPTOMETRIST

## 2021-10-27 PROCEDURE — 92004 COMPRE OPH EXAM NEW PT 1/>: CPT | Mod: S$GLB,,, | Performed by: OPTOMETRIST

## 2021-10-27 PROCEDURE — 3288F FALL RISK ASSESSMENT DOCD: CPT | Mod: CPTII,S$GLB,, | Performed by: STUDENT IN AN ORGANIZED HEALTH CARE EDUCATION/TRAINING PROGRAM

## 2021-10-27 PROCEDURE — 3288F PR FALLS RISK ASSESSMENT DOCUMENTED: ICD-10-PCS | Mod: CPTII,S$GLB,, | Performed by: STUDENT IN AN ORGANIZED HEALTH CARE EDUCATION/TRAINING PROGRAM

## 2021-10-27 PROCEDURE — 3080F DIAST BP >= 90 MM HG: CPT | Mod: CPTII,S$GLB,, | Performed by: STUDENT IN AN ORGANIZED HEALTH CARE EDUCATION/TRAINING PROGRAM

## 2021-10-27 PROCEDURE — 99215 OFFICE O/P EST HI 40 MIN: CPT | Mod: S$GLB,,, | Performed by: STUDENT IN AN ORGANIZED HEALTH CARE EDUCATION/TRAINING PROGRAM

## 2021-10-27 PROCEDURE — 1101F PR PT FALLS ASSESS DOC 0-1 FALLS W/OUT INJ PAST YR: ICD-10-PCS | Mod: CPTII,S$GLB,, | Performed by: OPTOMETRIST

## 2021-10-27 PROCEDURE — 99999 PR PBB SHADOW E&M-EST. PATIENT-LVL II: ICD-10-PCS | Mod: PBBFAC,,, | Performed by: STUDENT IN AN ORGANIZED HEALTH CARE EDUCATION/TRAINING PROGRAM

## 2021-10-27 PROCEDURE — 99999 PR PBB SHADOW E&M-EST. PATIENT-LVL III: ICD-10-PCS | Mod: PBBFAC,,, | Performed by: OPTOMETRIST

## 2021-10-27 PROCEDURE — 99999 PR PBB SHADOW E&M-EST. PATIENT-LVL II: CPT | Mod: PBBFAC,,, | Performed by: STUDENT IN AN ORGANIZED HEALTH CARE EDUCATION/TRAINING PROGRAM

## 2021-10-27 PROCEDURE — 92004 PR EYE EXAM, NEW PATIENT,COMPREHESV: ICD-10-PCS | Mod: S$GLB,,, | Performed by: OPTOMETRIST

## 2021-10-27 PROCEDURE — 3077F PR MOST RECENT SYSTOLIC BLOOD PRESSURE >= 140 MM HG: ICD-10-PCS | Mod: CPTII,S$GLB,, | Performed by: STUDENT IN AN ORGANIZED HEALTH CARE EDUCATION/TRAINING PROGRAM

## 2021-10-27 PROCEDURE — 3288F PR FALLS RISK ASSESSMENT DOCUMENTED: ICD-10-PCS | Mod: CPTII,S$GLB,, | Performed by: OPTOMETRIST

## 2021-10-27 PROCEDURE — 3077F SYST BP >= 140 MM HG: CPT | Mod: CPTII,S$GLB,, | Performed by: STUDENT IN AN ORGANIZED HEALTH CARE EDUCATION/TRAINING PROGRAM

## 2021-10-27 PROCEDURE — 1126F AMNT PAIN NOTED NONE PRSNT: CPT | Mod: CPTII,S$GLB,, | Performed by: OPTOMETRIST

## 2021-10-27 PROCEDURE — 1160F RVW MEDS BY RX/DR IN RCRD: CPT | Mod: CPTII,S$GLB,, | Performed by: OPTOMETRIST

## 2021-10-27 PROCEDURE — 99999 PR PBB SHADOW E&M-EST. PATIENT-LVL III: CPT | Mod: PBBFAC,,, | Performed by: OPTOMETRIST

## 2021-10-27 PROCEDURE — 1126F PR PAIN SEVERITY QUANTIFIED, NO PAIN PRESENT: ICD-10-PCS | Mod: CPTII,S$GLB,, | Performed by: STUDENT IN AN ORGANIZED HEALTH CARE EDUCATION/TRAINING PROGRAM

## 2021-11-08 DIAGNOSIS — M51.9 FORAMINAL STENOSIS DUE TO INTERVERTEBRAL DISC DISEASE: Primary | ICD-10-CM

## 2021-11-08 DIAGNOSIS — M99.79 FORAMINAL STENOSIS DUE TO INTERVERTEBRAL DISC DISEASE: Primary | ICD-10-CM

## 2021-11-08 DIAGNOSIS — R79.1 ABNORMAL COAGULATION PROFILE: ICD-10-CM

## 2021-11-08 RX ORDER — SODIUM CHLORIDE, SODIUM LACTATE, POTASSIUM CHLORIDE, CALCIUM CHLORIDE 600; 310; 30; 20 MG/100ML; MG/100ML; MG/100ML; MG/100ML
INJECTION, SOLUTION INTRAVENOUS CONTINUOUS
Status: CANCELLED | OUTPATIENT
Start: 2021-11-08

## 2021-11-08 RX ORDER — LIDOCAINE HYDROCHLORIDE 10 MG/ML
1 INJECTION, SOLUTION EPIDURAL; INFILTRATION; INTRACAUDAL; PERINEURAL ONCE
Status: CANCELLED | OUTPATIENT
Start: 2021-11-08 | End: 2021-11-08

## 2021-11-09 DIAGNOSIS — M99.79 FORAMINAL STENOSIS DUE TO INTERVERTEBRAL DISC DISEASE: Primary | ICD-10-CM

## 2021-11-09 DIAGNOSIS — M51.9 FORAMINAL STENOSIS DUE TO INTERVERTEBRAL DISC DISEASE: Primary | ICD-10-CM

## 2021-11-22 PROBLEM — M51.9 FORAMINAL STENOSIS DUE TO INTERVERTEBRAL DISC DISEASE: Status: ACTIVE | Noted: 2021-11-22

## 2021-11-22 PROBLEM — M99.79 FORAMINAL STENOSIS DUE TO INTERVERTEBRAL DISC DISEASE: Status: ACTIVE | Noted: 2021-11-22

## 2021-11-26 PROCEDURE — G0180 MD CERTIFICATION HHA PATIENT: HCPCS | Mod: ,,, | Performed by: STUDENT IN AN ORGANIZED HEALTH CARE EDUCATION/TRAINING PROGRAM

## 2021-11-26 PROCEDURE — G0180 PR HOME HEALTH MD CERTIFICATION: ICD-10-PCS | Mod: ,,, | Performed by: STUDENT IN AN ORGANIZED HEALTH CARE EDUCATION/TRAINING PROGRAM

## 2021-12-03 RX ORDER — TIZANIDINE 4 MG/1
4 TABLET ORAL EVERY 8 HOURS PRN
Qty: 30 TABLET | Refills: 0 | Status: CANCELLED | OUTPATIENT
Start: 2021-12-03 | End: 2021-12-13

## 2021-12-03 RX ORDER — OXYCODONE AND ACETAMINOPHEN 7.5; 325 MG/1; MG/1
1 TABLET ORAL EVERY 6 HOURS PRN
Qty: 28 TABLET | Refills: 0 | Status: SHIPPED | OUTPATIENT
Start: 2021-12-03 | End: 2021-12-09 | Stop reason: SDUPTHER

## 2021-12-03 RX ORDER — OXYCODONE AND ACETAMINOPHEN 10; 325 MG/1; MG/1
1 TABLET ORAL EVERY 6 HOURS PRN
Qty: 28 TABLET | Refills: 0 | Status: CANCELLED | OUTPATIENT
Start: 2021-12-03

## 2021-12-07 ENCOUNTER — TELEPHONE (OUTPATIENT)
Dept: NEUROSURGERY | Facility: CLINIC | Age: 66
End: 2021-12-07
Payer: MEDICARE

## 2021-12-09 ENCOUNTER — TELEPHONE (OUTPATIENT)
Dept: NEUROSURGERY | Facility: CLINIC | Age: 66
End: 2021-12-09
Payer: MEDICARE

## 2021-12-09 RX ORDER — OXYCODONE AND ACETAMINOPHEN 7.5; 325 MG/1; MG/1
1 TABLET ORAL EVERY 6 HOURS PRN
Qty: 28 TABLET | Refills: 0 | Status: SHIPPED | OUTPATIENT
Start: 2021-12-09 | End: 2021-12-17 | Stop reason: SDUPTHER

## 2021-12-14 ENCOUNTER — EXTERNAL HOME HEALTH (OUTPATIENT)
Dept: HOME HEALTH SERVICES | Facility: HOSPITAL | Age: 66
End: 2021-12-14
Payer: MEDICARE

## 2021-12-17 RX ORDER — TIZANIDINE 4 MG/1
4 TABLET ORAL EVERY 8 HOURS PRN
Qty: 60 TABLET | Refills: 0 | Status: SHIPPED | OUTPATIENT
Start: 2021-12-17 | End: 2022-01-03 | Stop reason: SDUPTHER

## 2021-12-17 RX ORDER — OXYCODONE AND ACETAMINOPHEN 7.5; 325 MG/1; MG/1
1 TABLET ORAL EVERY 6 HOURS PRN
Qty: 28 TABLET | Refills: 0 | Status: SHIPPED | OUTPATIENT
Start: 2021-12-17 | End: 2021-12-21 | Stop reason: SDUPTHER

## 2021-12-21 RX ORDER — OXYCODONE AND ACETAMINOPHEN 7.5; 325 MG/1; MG/1
1 TABLET ORAL EVERY 6 HOURS PRN
Qty: 28 TABLET | Refills: 0 | Status: SHIPPED | OUTPATIENT
Start: 2021-12-21 | End: 2022-01-03 | Stop reason: SDUPTHER

## 2022-01-03 RX ORDER — OXYCODONE AND ACETAMINOPHEN 7.5; 325 MG/1; MG/1
1 TABLET ORAL EVERY 8 HOURS PRN
Qty: 21 TABLET | Refills: 0 | Status: SHIPPED | OUTPATIENT
Start: 2022-01-03 | End: 2022-01-27

## 2022-01-03 RX ORDER — TIZANIDINE 4 MG/1
4 TABLET ORAL EVERY 8 HOURS PRN
Qty: 60 TABLET | Refills: 0 | Status: SHIPPED | OUTPATIENT
Start: 2022-01-03 | End: 2022-01-27

## 2022-01-03 NOTE — TELEPHONE ENCOUNTER
----- Message from Eneida Argueta sent at 1/3/2022  9:57 AM CST -----  Regarding: Refill  Contact: Patient  Type:  RX Refill Request    Who Called:  Patient  Refill or New Rx:  Refill  RX Name and Strength:    oxyCODONE-acetaminophen (PERCOCET) 7.5-325 mg per tablet  tiZANidine (ZANAFLEX) 4 MG tablet  How is the patient currently taking it? (ex. 1XDay):  1xDay at night  Is this a 30 day or 90 day RX:  30  Preferred Pharmacy with phone number:    [facility] - [place], [place] - [address]  [address]  [facility] [address]  Phone: [phone] Fax: [phone]  Local or Mail Order:  local  Ordering Provider:  Dr. Lazaro Barnett Call Back Number:  [phone] (home)   Additional Information:  Please contact the patient to advise. Thanks!

## 2022-01-06 ENCOUNTER — OFFICE VISIT (OUTPATIENT)
Dept: NEUROSURGERY | Facility: CLINIC | Age: 67
End: 2022-01-06
Payer: MEDICARE

## 2022-01-06 ENCOUNTER — HOSPITAL ENCOUNTER (OUTPATIENT)
Dept: RADIOLOGY | Facility: HOSPITAL | Age: 67
Discharge: HOME | End: 2022-01-06
Attending: STUDENT IN AN ORGANIZED HEALTH CARE EDUCATION/TRAINING PROGRAM
Payer: MEDICARE

## 2022-01-06 VITALS
HEIGHT: 68 IN | RESPIRATION RATE: 18 BRPM | BODY MASS INDEX: 33.65 KG/M2 | HEART RATE: 73 BPM | DIASTOLIC BLOOD PRESSURE: 85 MMHG | WEIGHT: 222 LBS | SYSTOLIC BLOOD PRESSURE: 139 MMHG

## 2022-01-06 DIAGNOSIS — M99.79 FORAMINAL STENOSIS DUE TO INTERVERTEBRAL DISC DISEASE: ICD-10-CM

## 2022-01-06 DIAGNOSIS — M51.9 FORAMINAL STENOSIS DUE TO INTERVERTEBRAL DISC DISEASE: ICD-10-CM

## 2022-01-06 DIAGNOSIS — Z98.1 S/P LUMBAR FUSION: Primary | ICD-10-CM

## 2022-01-06 PROCEDURE — 72100 XR LUMBAR SPINE AP AND LATERAL: ICD-10-PCS | Mod: 26,,, | Performed by: RADIOLOGY

## 2022-01-06 PROCEDURE — 3075F PR MOST RECENT SYSTOLIC BLOOD PRESS GE 130-139MM HG: ICD-10-PCS | Mod: CPTII,S$GLB,, | Performed by: STUDENT IN AN ORGANIZED HEALTH CARE EDUCATION/TRAINING PROGRAM

## 2022-01-06 PROCEDURE — 99024 POSTOP FOLLOW-UP VISIT: CPT | Mod: S$GLB,,, | Performed by: STUDENT IN AN ORGANIZED HEALTH CARE EDUCATION/TRAINING PROGRAM

## 2022-01-06 PROCEDURE — 3288F FALL RISK ASSESSMENT DOCD: CPT | Mod: CPTII,S$GLB,, | Performed by: STUDENT IN AN ORGANIZED HEALTH CARE EDUCATION/TRAINING PROGRAM

## 2022-01-06 PROCEDURE — 1159F PR MEDICATION LIST DOCUMENTED IN MEDICAL RECORD: ICD-10-PCS | Mod: CPTII,S$GLB,, | Performed by: STUDENT IN AN ORGANIZED HEALTH CARE EDUCATION/TRAINING PROGRAM

## 2022-01-06 PROCEDURE — 3288F PR FALLS RISK ASSESSMENT DOCUMENTED: ICD-10-PCS | Mod: CPTII,S$GLB,, | Performed by: STUDENT IN AN ORGANIZED HEALTH CARE EDUCATION/TRAINING PROGRAM

## 2022-01-06 PROCEDURE — 1100F PR PT FALLS ASSESS DOC 2+ FALLS/FALL W/INJURY/YR: ICD-10-PCS | Mod: CPTII,S$GLB,, | Performed by: STUDENT IN AN ORGANIZED HEALTH CARE EDUCATION/TRAINING PROGRAM

## 2022-01-06 PROCEDURE — 99024 PR POST-OP FOLLOW-UP VISIT: ICD-10-PCS | Mod: S$GLB,,, | Performed by: STUDENT IN AN ORGANIZED HEALTH CARE EDUCATION/TRAINING PROGRAM

## 2022-01-06 PROCEDURE — 3075F SYST BP GE 130 - 139MM HG: CPT | Mod: CPTII,S$GLB,, | Performed by: STUDENT IN AN ORGANIZED HEALTH CARE EDUCATION/TRAINING PROGRAM

## 2022-01-06 PROCEDURE — 3079F PR MOST RECENT DIASTOLIC BLOOD PRESSURE 80-89 MM HG: ICD-10-PCS | Mod: CPTII,S$GLB,, | Performed by: STUDENT IN AN ORGANIZED HEALTH CARE EDUCATION/TRAINING PROGRAM

## 2022-01-06 PROCEDURE — 1100F PTFALLS ASSESS-DOCD GE2>/YR: CPT | Mod: CPTII,S$GLB,, | Performed by: STUDENT IN AN ORGANIZED HEALTH CARE EDUCATION/TRAINING PROGRAM

## 2022-01-06 PROCEDURE — 3079F DIAST BP 80-89 MM HG: CPT | Mod: CPTII,S$GLB,, | Performed by: STUDENT IN AN ORGANIZED HEALTH CARE EDUCATION/TRAINING PROGRAM

## 2022-01-06 PROCEDURE — 1125F AMNT PAIN NOTED PAIN PRSNT: CPT | Mod: CPTII,S$GLB,, | Performed by: STUDENT IN AN ORGANIZED HEALTH CARE EDUCATION/TRAINING PROGRAM

## 2022-01-06 PROCEDURE — 3008F PR BODY MASS INDEX (BMI) DOCUMENTED: ICD-10-PCS | Mod: CPTII,S$GLB,, | Performed by: STUDENT IN AN ORGANIZED HEALTH CARE EDUCATION/TRAINING PROGRAM

## 2022-01-06 PROCEDURE — 1125F PR PAIN SEVERITY QUANTIFIED, PAIN PRESENT: ICD-10-PCS | Mod: CPTII,S$GLB,, | Performed by: STUDENT IN AN ORGANIZED HEALTH CARE EDUCATION/TRAINING PROGRAM

## 2022-01-06 PROCEDURE — 1159F MED LIST DOCD IN RCRD: CPT | Mod: CPTII,S$GLB,, | Performed by: STUDENT IN AN ORGANIZED HEALTH CARE EDUCATION/TRAINING PROGRAM

## 2022-01-06 PROCEDURE — 3008F BODY MASS INDEX DOCD: CPT | Mod: CPTII,S$GLB,, | Performed by: STUDENT IN AN ORGANIZED HEALTH CARE EDUCATION/TRAINING PROGRAM

## 2022-01-06 PROCEDURE — 72100 X-RAY EXAM L-S SPINE 2/3 VWS: CPT | Mod: 26,,, | Performed by: RADIOLOGY

## 2022-01-06 PROCEDURE — 72100 X-RAY EXAM L-S SPINE 2/3 VWS: CPT | Mod: TC,FY,PO

## 2022-01-06 NOTE — PROGRESS NOTES
"REFERRING PHYSICIAN:    No ref. provider found  N/A    Postoperative visit     CLINICAL PROGRESS:   Ap Chi is now  6 weeks s/p 3-5 ATP, LIF, psf  L2-3 decompresion MIS    Getting better, did home pT initially but stopped  Still fatigues and using RW  Doing more slowly    Notes left groin and thigh dec sensation and paresthesias but improving        MEDICATIONS:                   List reviewed, see chart for dosing details.    ALLERGIES:       Review of patient's allergies indicates:  No Known Allergies    PHYSICAL EXAMINATION:          VITAL SIGNS:   /85 (BP Location: Left arm, Patient Position: Sitting)   Pulse 73   Resp 18   Ht 5' 8" (1.727 m)   Wt 100.7 kg (222 lb 0.1 oz)   BMI 33.76 kg/m²     GENERAL:  Patient is well developed, well nourished, calm, collected, and in no apparent distress.  Incision is healed    NEUROLOGICAL:    LE strength                     R          L     IP           5           5     Quad      5           5     TA          5           5      EHL      5           5      GS        5           5      Dec lt/pp left groin and thigh        Gait is impaired - minimum ambulate independent uses RW assist, hx chronic circumduction /gait abnormality    IMAGING DATA:  Stable alignment, intact hardware without subsidence      No flowsheet data found.      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    This note was partially dictated using voice recognition software, so please excuse any errors that were not corrected.      "

## 2022-01-12 ENCOUNTER — TELEPHONE (OUTPATIENT)
Dept: NEUROSURGERY | Facility: CLINIC | Age: 67
End: 2022-01-12
Payer: MEDICARE

## 2022-01-27 ENCOUNTER — OFFICE VISIT (OUTPATIENT)
Dept: PAIN MEDICINE | Facility: CLINIC | Age: 67
End: 2022-01-27
Payer: MEDICARE

## 2022-01-27 VITALS
BODY MASS INDEX: 33.86 KG/M2 | DIASTOLIC BLOOD PRESSURE: 77 MMHG | WEIGHT: 222.69 LBS | TEMPERATURE: 98 F | RESPIRATION RATE: 18 BRPM | OXYGEN SATURATION: 100 % | SYSTOLIC BLOOD PRESSURE: 148 MMHG | HEART RATE: 88 BPM

## 2022-01-27 DIAGNOSIS — M79.18 MYOFASCIAL PAIN: Primary | ICD-10-CM

## 2022-01-27 DIAGNOSIS — M54.16 LUMBAR RADICULOPATHY: ICD-10-CM

## 2022-01-27 PROCEDURE — 1125F PR PAIN SEVERITY QUANTIFIED, PAIN PRESENT: ICD-10-PCS | Mod: CPTII,S$GLB,, | Performed by: ANESTHESIOLOGY

## 2022-01-27 PROCEDURE — 3008F BODY MASS INDEX DOCD: CPT | Mod: CPTII,S$GLB,, | Performed by: ANESTHESIOLOGY

## 2022-01-27 PROCEDURE — 3288F FALL RISK ASSESSMENT DOCD: CPT | Mod: CPTII,S$GLB,, | Performed by: ANESTHESIOLOGY

## 2022-01-27 PROCEDURE — 3288F PR FALLS RISK ASSESSMENT DOCUMENTED: ICD-10-PCS | Mod: CPTII,S$GLB,, | Performed by: ANESTHESIOLOGY

## 2022-01-27 PROCEDURE — 3078F PR MOST RECENT DIASTOLIC BLOOD PRESSURE < 80 MM HG: ICD-10-PCS | Mod: CPTII,S$GLB,, | Performed by: ANESTHESIOLOGY

## 2022-01-27 PROCEDURE — 1125F AMNT PAIN NOTED PAIN PRSNT: CPT | Mod: CPTII,S$GLB,, | Performed by: ANESTHESIOLOGY

## 2022-01-27 PROCEDURE — 1101F PR PT FALLS ASSESS DOC 0-1 FALLS W/OUT INJ PAST YR: ICD-10-PCS | Mod: CPTII,S$GLB,, | Performed by: ANESTHESIOLOGY

## 2022-01-27 PROCEDURE — 99214 PR OFFICE/OUTPT VISIT, EST, LEVL IV, 30-39 MIN: ICD-10-PCS | Mod: S$GLB,,, | Performed by: ANESTHESIOLOGY

## 2022-01-27 PROCEDURE — 99214 OFFICE O/P EST MOD 30 MIN: CPT | Mod: S$GLB,,, | Performed by: ANESTHESIOLOGY

## 2022-01-27 PROCEDURE — 1159F MED LIST DOCD IN RCRD: CPT | Mod: CPTII,S$GLB,, | Performed by: ANESTHESIOLOGY

## 2022-01-27 PROCEDURE — 99999 PR PBB SHADOW E&M-EST. PATIENT-LVL III: CPT | Mod: PBBFAC,,, | Performed by: ANESTHESIOLOGY

## 2022-01-27 PROCEDURE — 1159F PR MEDICATION LIST DOCUMENTED IN MEDICAL RECORD: ICD-10-PCS | Mod: CPTII,S$GLB,, | Performed by: ANESTHESIOLOGY

## 2022-01-27 PROCEDURE — 3077F SYST BP >= 140 MM HG: CPT | Mod: CPTII,S$GLB,, | Performed by: ANESTHESIOLOGY

## 2022-01-27 PROCEDURE — 3008F PR BODY MASS INDEX (BMI) DOCUMENTED: ICD-10-PCS | Mod: CPTII,S$GLB,, | Performed by: ANESTHESIOLOGY

## 2022-01-27 PROCEDURE — 3078F DIAST BP <80 MM HG: CPT | Mod: CPTII,S$GLB,, | Performed by: ANESTHESIOLOGY

## 2022-01-27 PROCEDURE — 1160F RVW MEDS BY RX/DR IN RCRD: CPT | Mod: CPTII,S$GLB,, | Performed by: ANESTHESIOLOGY

## 2022-01-27 PROCEDURE — 3077F PR MOST RECENT SYSTOLIC BLOOD PRESSURE >= 140 MM HG: ICD-10-PCS | Mod: CPTII,S$GLB,, | Performed by: ANESTHESIOLOGY

## 2022-01-27 PROCEDURE — 1101F PT FALLS ASSESS-DOCD LE1/YR: CPT | Mod: CPTII,S$GLB,, | Performed by: ANESTHESIOLOGY

## 2022-01-27 PROCEDURE — 1160F PR REVIEW ALL MEDS BY PRESCRIBER/CLIN PHARMACIST DOCUMENTED: ICD-10-PCS | Mod: CPTII,S$GLB,, | Performed by: ANESTHESIOLOGY

## 2022-01-27 PROCEDURE — 99999 PR PBB SHADOW E&M-EST. PATIENT-LVL III: ICD-10-PCS | Mod: PBBFAC,,, | Performed by: ANESTHESIOLOGY

## 2022-01-27 RX ORDER — TIZANIDINE 4 MG/1
4 TABLET ORAL 2 TIMES DAILY PRN
Qty: 40 TABLET | Refills: 1 | Status: SHIPPED | OUTPATIENT
Start: 2022-01-27 | End: 2022-06-30

## 2022-01-27 RX ORDER — HYDROCODONE BITARTRATE AND ACETAMINOPHEN 7.5; 325 MG/1; MG/1
1 TABLET ORAL EVERY 12 HOURS PRN
Qty: 60 TABLET | Refills: 0 | Status: SHIPPED | OUTPATIENT
Start: 2022-01-27 | End: 2022-06-30

## 2022-01-27 NOTE — H&P (VIEW-ONLY)
[facility] Follow Up Evaluation    Referred by: Dr. Willis  Reason for referral: back pain    CC:   Chief Complaint   Patient presents with    Follow-up    Low-back Pain      Last 3 PDI Scores 1/27/2022 8/23/2021 12/7/2020   Pain Disability Index (PDI) 16 6 21       Interval HPI 1/27/22: Mr Chi returns to the office for follow up.  Since I last saw him he is now s/p L3-5 posterior spinal fusion MIS, L3-5 instrumentation, L2-3 laminectomy on 11/22/21 with Dr. Willis.    Today he reports pain into the left groin and left anterior thigh, aching, deep, numb, constant.  Right leg symptoms improved following surgery.  He has been taking oxycodone and tizanidine.  Avoids NSAIDs due to his fusion.      Pain intervention history:   - s/p L5/S1 lumbar ANN-MARIE on 11/20/2020 with 60-70% relief  - s/p L5/S1 ANN-MARIE on 8/4/21 with over 50% relief  - s/p L3-5 posterior spinal fusion MIS, L3-5 instrumentation, L2-3 laminectomy on 11/22/21 with Dr. Willis    HPI:   Ap Chi is a 66 y.o. male who complains of back pain    Onset: over a year  Inciting Event: none  Progression: since onset, pain is gradually worsening  Current Pain Score: 6/10  Typical Range: 2-8/10  Timing: constant  Quality: aching, throbbing  Radiation: yes, down both legs  Associated numbness or weakness: yes numbness in feet has improved since surgery, yes weakness right leg chronic  Exacerbated by: standing, walking, activity  Allievated by: rest  Is Pain Level Acceptable?: No    Previous Therapies:  PT/OT: yes, in the past  HEP:   Interventions:   Surgery:  Medications:   - NSAIDS:   - MSK Relaxants:   - TCAs:   - SNRIs:   - Topicals:   - Anticonvulsants:  - Opioids:     History:    Current Outpatient Medications:     aspirin (ECOTRIN) 81 MG EC tablet, Take 1 tablet (81 mg total) by mouth once daily., Disp: 90 tablet, Rfl: 3    cetirizine (ZYRTEC) 10 MG tablet, Take 10 mg by mouth as needed for Allergies., Disp: , Rfl:     gabapentin  (NEURONTIN) 300 MG capsule, Take 1 capsule (300 mg total) by mouth 3 (three) times daily., Disp: 90 capsule, Rfl: 11    HYDROcodone-acetaminophen (NORCO) 7.5-325 mg per tablet, Take 1 tablet by mouth every 12 (twelve) hours as needed for Pain., Disp: 60 tablet, Rfl: 0    levothyroxine (SYNTHROID) 137 MCG Tab tablet, Take 137 mcg by mouth once daily. TAKE AM OF SURGERY, Disp: , Rfl:     pravastatin (PRAVACHOL) 20 MG tablet, Take 1 tablet (20 mg total) by mouth once daily. (Patient not taking: No sig reported), Disp: 90 tablet, Rfl: 3    tiZANidine (ZANAFLEX) 4 MG tablet, Take 1 tablet (4 mg total) by mouth 2 (two) times daily as needed (muscle spasms)., Disp: 40 tablet, Rfl: 1    Past Medical History:   Diagnosis Date    Anticoagulant long-term use     Foot drop, right foot     Hyperlipidemia     Thyroid disease        Past Surgical History:   Procedure Laterality Date    ANTERIOR CERVICAL DISCECTOMY W/ FUSION N/A 9/15/2020    Procedure: DISCECTOMY, SPINE, CERVICAL, ANTERIOR APPROACH, WITH FUSION  C4-5, C5-6, C6-7, C7-1;  Surgeon: Hussain Willis MD;  Location: [facility] OR;  Service: Neurosurgery;  Laterality: N/A;    CORONARY ANGIOGRAPHY Left 5/26/2020    Procedure: ANGIOGRAM, CORONARY ARTERY;  Surgeon: Jacobo Mcnair MD;  Location: [facility] CATH;  Service: Cardiology;  Laterality: Left;    EPIDURAL STEROID INJECTION INTO LUMBAR SPINE N/A 11/20/2020    Procedure: Injection-steroid-epidural-lumbar L5/S1;  Surgeon: Ash Weldon MD;  Location: [facility] OR;  Service: Pain Management;  Laterality: N/A;    EPIDURAL STEROID INJECTION INTO LUMBAR SPINE N/A 8/4/2021    Procedure: Injection-steroid-epidural-lumbar L5/S1;  Surgeon: Ash Weldon MD;  Location: [facility] OR;  Service: Pain Management;  Laterality: N/A;    FUSION, SPINE, MINIMALLY INVASIVE, USING COMPUTER-ASSISTED NAVIGATION N/A 11/22/2021    Procedure: FUSION,SPINE,MINIMALLY INVASIVE,USING COMPUTER-ASSISTED NAVIGATION L3-5;  Surgeon: Hussain Willis MD;  Location:  [facility] OR;  Service: Neurosurgery;  Laterality: N/A;    LAMINECTOMY USING MINIMALLY INVASIVE TECHNIQUE N/A 2021    Procedure: LAMINECTOMY, SPINE, MINIMALLY INVASIVE L2-3,;  Surgeon: Hussain Willis MD;  Location: [facility] OR;  Service: Neurosurgery;  Laterality: N/A;    LEFT HEART CATHETERIZATION Left 2020    Procedure: Left heart cath;  Surgeon: Jacobo Mcnair MD;  Location: [facility] CATH;  Service: Cardiology;  Laterality: Left;    NASAL SEPTUM SURGERY      VASECTOMY         Family History   Problem Relation Age of Onset    Cancer Mother     Heart disease Father 59        CABG    Stroke Father     Cataracts Father     Heart disease Brother         mild CAD    Multiple sclerosis Sister     Alcohol abuse Brother     Seizures Brother     Glaucoma Neg Hx     Macular degeneration Neg Hx     Retinal detachment Neg Hx        Social History     Socioeconomic History    Marital status:    Tobacco Use    Smoking status: Former Smoker     Quit date:      Years since quittin.1    Smokeless tobacco: Never Used    Tobacco comment: tried as a teen ager.   Substance and Sexual Activity    Alcohol use: Yes     Comment: Rare beer.    Drug use: Never    Sexual activity: Yes     Partners: Female     Social Determinants of Health     Financial Resource Strain: Low Risk     Difficulty of Paying Living Expenses: Not hard at all   Food Insecurity: No Food Insecurity    Worried About Running Out of Food in the Last Year: Never true    Ran Out of Food in the Last Year: Never true   Transportation Needs: No Transportation Needs    Lack of Transportation (Medical): No    Lack of Transportation (Non-Medical): No   Physical Activity: Unknown    Days of Exercise per Week: Patient refused   Stress: Unknown    Feeling of Stress : Patient refused   Social Connections: Unknown    Frequency of Communication with Friends and Family: More than three times a week    Frequency of Social Gatherings with  Friends and Family: More than three times a week    Active Member of Clubs or Organizations: Yes    Attends Club or Organization Meetings: More than 4 times per year    Marital Status:    Housing Stability: Low Risk     Unable to Pay for Housing in the Last Year: No    Number of Places Lived in the Last Year: 1    Unstable Housing in the Last Year: No       Review of patient's allergies indicates:  No Known Allergies    Review of Systems:  General ROS: negative for - fever  Psychological ROS: negative for - hostility  Hematological and Lymphatic ROS: negative for - bleeding problems  Endocrine ROS: negative for - unexpected weight changes  Respiratory ROS: no cough, shortness of breath, or wheezing  Cardiovascular ROS: no chest pain or dyspnea on exertion  Gastrointestinal ROS: no abdominal pain, change in bowel habits, or black or bloody stools  Musculoskeletal ROS: positive for - muscular weakness  Neurological ROS: positive for - numbness/tingling  Dermatological ROS: negative for rash    Physical Exam:  Vitals:    01/27/22 1119   BP: (!) 148/77   Pulse: 88   Resp: 18   Temp: 97.8 °F (36.6 °C)   TempSrc: Oral   SpO2: 100%   Weight: 101 kg (222 lb 10.6 oz)   PainSc:   6   PainLoc: Back     Body mass index is 33.86 kg/m².     Gen: NAD  Gait: gait intact, ambulating with Rollator assistants  Psych:  Mood appropriate for given condition  HEENT: eyes anicteric   GI: Abd soft  CV: RRR  Lungs: breathing unlabored   ROM: limited AROM of the L spine in all planes, full ROM at ankles, knees and hips  Lumbar flexion 90 degrees, extension 50 degrees, side bending 30 degrees.    Sensation: intact to light touch in all dermatomes tested from L2-S1 bilaterally, except decreased over left proximal thigh  Reflexes: 3+ b/l patella and 3+ b/l achilles  Palpation: Diffusely tender over lumbar paraspinals  -TTP over the b/l greater trochanters and bilateral SI joint  Tone: normal in the b/l knees and hips   Skin:  intact  Extremities: No edema in b/l ankles or hands  Provacative tests:        Right Left   L2/3 Iliacus Hip flexion  5  5   L3/4 Qudratus Femoris Knee Extension  5  5   L4/5 Tib Anterior Ankle Dorsiflexion   5  5   L5/S1 Extensor Hallicus Longus Great toe extension  5  5                 S1/S2 Gastroc/Soleus Plantar Flexion  5  5       Imaging:  MRI lumbar spine 8/26/21  FINDINGS:  Alignment: Mild levoconvex curvature of the lumbar spine.  Lumbar lordosis is maintained.  Stable trace retrolisthesis of L1 on L2.     Vertebrae: Vertebral body heights are maintained.  No evidence of an acute fracture or aggressive marrow replacement process. Multilevel mixed degenerative endplate change and anterior marginal osteophyte formation.  Partial osseous fusion across the L5-S1 disc space posteriorly and on the right.     Discs: Multilevel advanced disc degeneration at L3-4, L4-5 and L5-S1 with severe disc space narrowing, prominent degenerative endplate change, and vacuum disc phenomenon.  Moderate to severe disc space narrowing with endplate change and vacuum disc phenomenon also noted at L1-2.     Cord: Normal.  Conus terminates at L1-2.     Degenerative findings:     T11-12: Circumferential disc bulge.  Moderate bilateral facet arthropathy and ligamentum flavum infolding.  Moderate to severe bilateral neural foraminal narrowing and moderate spinal canal stenosis.  T12-L1: Mild bilateral facet arthropathy.  No neural foraminal or spinal canal stenosis.  L1-L2: Retrolisthesis.  Circumferential disc bulge with posterior osteophytic ridging.  Mild bilateral facet arthropathy and ligamentum flavum infolding.  Severe right and moderate left neural foraminal stenosis.  Mild spinal canal stenosis.  L2-L3: Circumferential disc bulge with right central disc protrusion.  Mild bilateral facet arthropathy and ligamentum flavum infolding.  Dorsal epidural lipomatosis.  Moderate left and mild right neural foraminal stenosis.  Moderate  spinal canal stenosis.  L3-L4: Circumferential disc bulge with posterior osteophytic ridging.  Moderate bilateral facet arthropathy and ligamentum flavum infolding.  Severe right and moderate left neural foraminal stenosis.  Mild-to-moderate spinal canal stenosis.  L4-L5: Circumferential disc bulge with posterior osteophytic ridging.  Moderate bilateral facet arthropathy and ligamentum flavum infolding.  Severe right and moderate to severe left neural foraminal stenosis.  Moderate spinal canal stenosis.  Narrowing of the right lateral recess with possible impingement upon the descending right S1 nerve root.  L5-S1: Posterior disc osteophyte complex.  Moderate bilateral facet arthropathy and ligamentum flavum infolding.  Moderate bilateral neural foraminal stenosis.  There is no spinal canal stenosis.     Paraspinal muscles & soft tissues: No significant abnormalities.    Xray lumbar spine 11/24/21  FINDINGS:  The patient has had a posterior fusion procedure involving L3, L4, and L5 in the interim since the prior study.  There are fusion rods in place on each side attached at all 3 levels by pedicle screws.  Disc spacers are also noted.  Vertebral bodies are well aligned.  The hardware appears intact.  There are degenerative changes present with findings consisting of vertebral body osteophyte formation, narrowing of the disc spaces, and irregularity and sclerosis of the facet joints.  There are atherosclerotic changes in the aorta and iliac arteries.     Labs:  BMP  Lab Results   Component Value Date     (L) 11/24/2021    K 4.3 11/24/2021     11/24/2021    CO2 27 11/24/2021    BUN 9 11/24/2021    CREATININE 0.70 11/24/2021    CALCIUM 8.1 (L) 11/24/2021    ANIONGAP 2 (L) 11/24/2021    ESTGFRAFRICA >60 11/24/2021    EGFRNONAA >60 11/24/2021     Lab Results   Component Value Date    ALT 32 05/06/2020    AST 28 05/06/2020    ALKPHOS 97 05/06/2020    BILITOT 0.2 05/06/2020     Lab Results   Component Value  Date    WBC 13.24 (H) 11/24/2021    HGB 11.5 (L) 11/24/2021    HCT 35.6 (L) 11/24/2021    MCV 92 11/24/2021     11/24/2021       Assessment:   Problem List Items Addressed This Visit        Neuro    Lumbar radiculopathy    Relevant Medications    HYDROcodone-acetaminophen (NORCO) 7.5-325 mg per tablet      Other Visit Diagnoses     Myofascial pain    -  Primary    Relevant Medications    tiZANidine (ZANAFLEX) 4 MG tablet          66 y.o. year old male with PMH CAD, hypothyroidism presents to the office with back pain.  He initially presented to neurosurgery with back pain but was found to have cervical myelopathy.  He is now s/p cervical ACDF and has improvement in his pain and also his strength.      1/27/22 - Mr Chi returns to the office for follow up.  Since I last saw him he is now s/p L3-5 posterior spinal fusion MIS, L3-5 instrumentation, L2-3 laminectomy on 11/22/21 with Dr. Willis.    Today he reports pain into the left groin and left anterior thigh, aching, deep, numb, constant.  Right leg symptoms improved following surgery.  He has been taking oxycodone and tizanidine.  Avoids NSAIDs due to his fusion.      - on exam he has full strength of his lower extremities, decreased sensation to light touch over the proximal left thigh  - hydrocodone 7.5/325mg po bid prn for severe pain.   - tizanidine 4mg po bid prn for myofascial pain   - I independently reviewed his lumbar MRI and he has L1-2 moderate left neural foraminal stenosis and L2-L3 dorsal epidural lipomatosis with moderate left neural foraminal stenosis and moderate spinal canal stenosis  - His pain is limiting her mobility and interfering with her ADL's.  - we will schedule for a left L1/2 and L2/3 TFESI.  - follow up 2 weeks post injection.     : Not applicable    Ash Weldon M.D.  Interventional Pain Medicine / Anesthesiology

## 2022-02-03 ENCOUNTER — TELEPHONE (OUTPATIENT)
Dept: PAIN MEDICINE | Facility: CLINIC | Age: 67
End: 2022-02-03
Payer: MEDICARE

## 2022-02-03 DIAGNOSIS — M54.16 LUMBAR RADICULOPATHY: Primary | ICD-10-CM

## 2022-02-03 RX ORDER — SODIUM CHLORIDE, SODIUM LACTATE, POTASSIUM CHLORIDE, CALCIUM CHLORIDE 600; 310; 30; 20 MG/100ML; MG/100ML; MG/100ML; MG/100ML
INJECTION, SOLUTION INTRAVENOUS CONTINUOUS
Status: CANCELLED | OUTPATIENT
Start: 2022-02-15

## 2022-02-03 NOTE — TELEPHONE ENCOUNTER
Spoke with pt and scheduled TF ANN-MARIE L1/2 + L2/3 with IV sedation with Dr. Weldon on 2/15/22 with 2 week follow up.

## 2022-02-03 NOTE — TELEPHONE ENCOUNTER
----- Message from Ash Weldon MD sent at 2/1/2022  8:05 AM CST -----  Can you please let MR. Chi know I've spoken with Dr. Willis.    We can schedule for a left L1/2 and L2/3 TFESI with IV sedation to see if that improves his groin and thigh pain

## 2022-02-11 RX ORDER — MIDAZOLAM HYDROCHLORIDE 1 MG/ML
2 INJECTION INTRAMUSCULAR; INTRAVENOUS ONCE
Status: CANCELLED | OUTPATIENT
Start: 2022-02-11 | End: 2022-02-11

## 2022-02-14 ENCOUNTER — TELEPHONE (OUTPATIENT)
Dept: SURGERY | Facility: HOSPITAL | Age: 67
End: 2022-02-14
Payer: MEDICARE

## 2022-02-14 NOTE — TELEPHONE ENCOUNTER
Good morning Dr. Weldon!    Mr. Chi is on the schedule for an ANN-MARIE tomorrow, 2/15. We do not have documentation of his previous positive COVID test. Patient states per trigger sheet he tested positive via home test on 1/17/22 and became asymptomatic on 1/30/22.     Just wanted to forward this info along to you to make sure you did not want us to do a rapid test on him morning of or anything.     Thank you!

## 2022-02-15 ENCOUNTER — HOSPITAL ENCOUNTER (OUTPATIENT)
Facility: HOSPITAL | Age: 67
Discharge: HOME | End: 2022-02-15
Attending: ANESTHESIOLOGY | Admitting: ANESTHESIOLOGY
Payer: MEDICARE

## 2022-02-15 ENCOUNTER — HOSPITAL ENCOUNTER (OUTPATIENT)
Dept: RADIOLOGY | Facility: HOSPITAL | Age: 67
Discharge: HOME | End: 2022-02-15
Attending: ANESTHESIOLOGY
Payer: MEDICARE

## 2022-02-15 DIAGNOSIS — M54.50 LOWER BACK PAIN: ICD-10-CM

## 2022-02-15 DIAGNOSIS — M54.16 LUMBAR RADICULOPATHY: Primary | ICD-10-CM

## 2022-02-15 PROCEDURE — 25500020 PHARM REV CODE 255: Mod: PO | Performed by: ANESTHESIOLOGY

## 2022-02-15 PROCEDURE — 64484 PRA INJECT ANES/STEROID FORAMEN LUMBAR/SACRAL W IMG GUIDE ,EA ADD LEVEL: ICD-10-PCS | Mod: LT,,, | Performed by: ANESTHESIOLOGY

## 2022-02-15 PROCEDURE — 64483 PR EPIDURAL INJ, ANES/STEROID, TRANSFORAMINAL, LUMB/SACR, SNGL LEVL: ICD-10-PCS | Mod: LT,,, | Performed by: ANESTHESIOLOGY

## 2022-02-15 PROCEDURE — 64483 NJX AA&/STRD TFRM EPI L/S 1: CPT | Mod: LT,,, | Performed by: ANESTHESIOLOGY

## 2022-02-15 PROCEDURE — 64483 NJX AA&/STRD TFRM EPI L/S 1: CPT | Mod: PO | Performed by: ANESTHESIOLOGY

## 2022-02-15 PROCEDURE — 25000003 PHARM REV CODE 250: Mod: PO | Performed by: ANESTHESIOLOGY

## 2022-02-15 PROCEDURE — 63600175 PHARM REV CODE 636 W HCPCS: Mod: PO | Performed by: ANESTHESIOLOGY

## 2022-02-15 PROCEDURE — 76000 FLUOROSCOPY <1 HR PHYS/QHP: CPT | Mod: TC,PO

## 2022-02-15 PROCEDURE — 64484 NJX AA&/STRD TFRM EPI L/S EA: CPT | Mod: PO | Performed by: ANESTHESIOLOGY

## 2022-02-15 PROCEDURE — A4216 STERILE WATER/SALINE, 10 ML: HCPCS | Mod: PO | Performed by: ANESTHESIOLOGY

## 2022-02-15 PROCEDURE — 64484 NJX AA&/STRD TFRM EPI L/S EA: CPT | Mod: LT,,, | Performed by: ANESTHESIOLOGY

## 2022-02-15 RX ORDER — BUPIVACAINE HYDROCHLORIDE 2.5 MG/ML
INJECTION, SOLUTION EPIDURAL; INFILTRATION; INTRACAUDAL
Status: DISCONTINUED | OUTPATIENT
Start: 2022-02-15 | End: 2022-02-15 | Stop reason: HOSPADM

## 2022-02-15 RX ORDER — LIDOCAINE HYDROCHLORIDE 10 MG/ML
INJECTION, SOLUTION EPIDURAL; INFILTRATION; INTRACAUDAL; PERINEURAL
Status: DISCONTINUED | OUTPATIENT
Start: 2022-02-15 | End: 2022-02-15 | Stop reason: HOSPADM

## 2022-02-15 RX ORDER — SODIUM CHLORIDE, SODIUM LACTATE, POTASSIUM CHLORIDE, CALCIUM CHLORIDE 600; 310; 30; 20 MG/100ML; MG/100ML; MG/100ML; MG/100ML
INJECTION, SOLUTION INTRAVENOUS CONTINUOUS
Status: DISCONTINUED | OUTPATIENT
Start: 2022-02-15 | End: 2022-02-15 | Stop reason: HOSPADM

## 2022-02-15 RX ORDER — MIDAZOLAM HYDROCHLORIDE 2 MG/2ML
INJECTION, SOLUTION INTRAMUSCULAR; INTRAVENOUS
Status: DISCONTINUED | OUTPATIENT
Start: 2022-02-15 | End: 2022-02-15 | Stop reason: HOSPADM

## 2022-02-15 RX ORDER — DEXAMETHASONE SODIUM PHOSPHATE 10 MG/ML
INJECTION INTRAMUSCULAR; INTRAVENOUS
Status: DISCONTINUED | OUTPATIENT
Start: 2022-02-15 | End: 2022-02-15 | Stop reason: HOSPADM

## 2022-02-15 RX ORDER — SODIUM CHLORIDE 9 MG/ML
INJECTION, SOLUTION INTRAMUSCULAR; INTRAVENOUS; SUBCUTANEOUS
Status: DISCONTINUED | OUTPATIENT
Start: 2022-02-15 | End: 2022-02-15 | Stop reason: HOSPADM

## 2022-02-15 RX ADMIN — SODIUM CHLORIDE, SODIUM LACTATE, POTASSIUM CHLORIDE, AND CALCIUM CHLORIDE: .6; .31; .03; .02 INJECTION, SOLUTION INTRAVENOUS at 01:02

## 2022-02-15 NOTE — DISCHARGE SUMMARY
[facility]  Discharge Note  Short Stay    Admit Date: 2/15/2022    Discharge Date: 2/15/2022    Attending Physician: Ash Weldon     Discharge Provider: Ash Weldon    Diagnoses:  There are no hospital problems to display for this patient.      Discharged Condition: Good    Final Diagnoses: Lumbar radiculopathy [M54.16]    Disposition: Home or Self Care    Hospital Course: No complications, uneventful    Outcome of Hospitalization, Treatment, Procedure, or Surgery:  Patient was admitted for outpatient interventional pain management procedure. The patient tolerated the procedure well with no complications.    Follow up/Patient Instructions:  Follow up as scheduled in Pain Management office in 2-3 weeks.  Patient has received instructions and follow up date and time.    Medications:  Continue previous medications, except restart aspirin in 24 hours    Discharge Procedure Orders   Notify your health care provider if you experience any of the following:  temperature >100.4     Notify your health care provider if you experience any of the following:  persistent nausea and vomiting or diarrhea     Notify your health care provider if you experience any of the following:  severe uncontrolled pain     Notify your health care provider if you experience any of the following:  redness, tenderness, or signs of infection (pain, swelling, redness, odor or green/yellow discharge around incision site)     Notify your health care provider if you experience any of the following:  difficulty breathing or increased cough     Notify your health care provider if you experience any of the following:  severe persistent headache     Notify your health care provider if you experience any of the following:  worsening rash     Notify your health care provider if you experience any of the following:  persistent dizziness, light-headedness, or visual disturbances     Notify your health care provider if you experience any of the  following:  increased confusion or weakness     Activity as tolerated

## 2022-02-15 NOTE — INTERVAL H&P NOTE
The patient has been examined and the H&P has been reviewed:    I concur with the findings and no changes have occurred since H&P was written.  The risks and benefits of this intervention, and alternative therapies were discussed with the patient.  The discussion of risks included infection, bleeding, need for additional procedures or surgery, nerve damage.  Questions regarding the procedure, risks, expected outcome, and possible side effects were solicited and answered to the patient's satisfaction.  Alberto Zengtrevaquyen wishes to proceed with the injection or procedure.  Written consent was obtained.      There are no hospital problems to display for this patient.

## 2022-02-15 NOTE — OP NOTE
Procedure Note    Procedure Date: 2/15/2022    Procedure Performed: left Transforaminal Epidural @ L1/2 and L2/3, with Fluoroscopic Guidance    Indications: Patient has failed conservative therapy.      Pre-op diagnosis: Lumbar Radiculopathy    Post-op diagnosis: same    Physician: Ash Weldon MD    IV anxiolysis medications: versed 2mg    Medications injected: 0.25% Bupivacaine 2ml, dexamethasone 15mg, 3 mL sterile, preservative-free normal saline.    Local anesthetic used: 1% Lidocaine 2 ml, 8.4% sodium bicarbonate 0.25ml    Estimated Blood Loss: none    Complications:  none    Technique:  The patient was interviewed in the holding area and Risks/Benefits were discussed, including, but not limited to, the possibility of new or different pain, bleeding or infection.   All questions were answered.  The patient understood and accepted risks.  Consent was reviewed.  A time out was taken to identify the patient, procedure and side of the procedure. The patient was placed in a prone position, then prepped and draped in the usual sterile fashion using ChloraPrep and sterile towels.  The Left L1 and L2 neural foramena were identified under fluoroscopic guidance in AP and oblique view.  Local anesthetic was given by raising a wheal and going down to the hub of a 25-gauge 1.5 inch needle.  In oblique view, a 3.5 inch 22-gauge bent-tip spinal needle was introduced into the foramen @ L1 and L2 and positioned in the posterior superior quarter of the foramen.  .5cc of Omnipaque contrast was injected live in an AP fluoroscopic view at each level demonstrating appropriate needle position with contrast spread outlining the respective nerve root and also medially into the epidural space without intravascular or intrathecal spread.  Then, after negative aspiration, a mixture of 2 mL Bupivacaine 0.25%, 15 mg dexamethasone, and 3 mL sterile, preservative-free normal saline. (total 5 mL) was divided evenly between the two levels and  injected slowly and incrementally.  The needle(s) was(were) flushed with normal saline and removed.  The patient tolerated the procedure well.  The patient was monitored after the procedure.  Patient was given post procedure and discharge instructions to follow at home. The patient was discharged in a stable condition.

## 2022-02-16 VITALS
OXYGEN SATURATION: 98 % | BODY MASS INDEX: 33.34 KG/M2 | DIASTOLIC BLOOD PRESSURE: 72 MMHG | WEIGHT: 220 LBS | RESPIRATION RATE: 18 BRPM | HEART RATE: 84 BPM | SYSTOLIC BLOOD PRESSURE: 138 MMHG | TEMPERATURE: 98 F | HEIGHT: 68 IN

## 2022-02-17 ENCOUNTER — PATIENT MESSAGE (OUTPATIENT)
Dept: NEUROSURGERY | Facility: CLINIC | Age: 67
End: 2022-02-17
Payer: MEDICARE

## 2022-02-17 DIAGNOSIS — Z98.1 S/P LUMBAR FUSION: Primary | ICD-10-CM

## 2022-02-28 ENCOUNTER — OFFICE VISIT (OUTPATIENT)
Dept: PAIN MEDICINE | Facility: CLINIC | Age: 67
End: 2022-02-28
Payer: MEDICARE

## 2022-02-28 VITALS
HEART RATE: 81 BPM | WEIGHT: 230.94 LBS | SYSTOLIC BLOOD PRESSURE: 158 MMHG | HEIGHT: 68 IN | DIASTOLIC BLOOD PRESSURE: 82 MMHG | BODY MASS INDEX: 35 KG/M2

## 2022-02-28 DIAGNOSIS — M54.16 LUMBAR RADICULOPATHY: Primary | ICD-10-CM

## 2022-02-28 DIAGNOSIS — M79.18 MYOFASCIAL PAIN: ICD-10-CM

## 2022-02-28 PROCEDURE — 3077F SYST BP >= 140 MM HG: CPT | Mod: CPTII,S$GLB,, | Performed by: ANESTHESIOLOGY

## 2022-02-28 PROCEDURE — 99999 PR PBB SHADOW E&M-EST. PATIENT-LVL III: CPT | Mod: PBBFAC,,, | Performed by: ANESTHESIOLOGY

## 2022-02-28 PROCEDURE — 3288F PR FALLS RISK ASSESSMENT DOCUMENTED: ICD-10-PCS | Mod: CPTII,S$GLB,, | Performed by: ANESTHESIOLOGY

## 2022-02-28 PROCEDURE — 1159F MED LIST DOCD IN RCRD: CPT | Mod: CPTII,S$GLB,, | Performed by: ANESTHESIOLOGY

## 2022-02-28 PROCEDURE — 1101F PT FALLS ASSESS-DOCD LE1/YR: CPT | Mod: CPTII,S$GLB,, | Performed by: ANESTHESIOLOGY

## 2022-02-28 PROCEDURE — 3288F FALL RISK ASSESSMENT DOCD: CPT | Mod: CPTII,S$GLB,, | Performed by: ANESTHESIOLOGY

## 2022-02-28 PROCEDURE — 99214 PR OFFICE/OUTPT VISIT, EST, LEVL IV, 30-39 MIN: ICD-10-PCS | Mod: S$GLB,,, | Performed by: ANESTHESIOLOGY

## 2022-02-28 PROCEDURE — 99999 PR PBB SHADOW E&M-EST. PATIENT-LVL III: ICD-10-PCS | Mod: PBBFAC,,, | Performed by: ANESTHESIOLOGY

## 2022-02-28 PROCEDURE — 3079F PR MOST RECENT DIASTOLIC BLOOD PRESSURE 80-89 MM HG: ICD-10-PCS | Mod: CPTII,S$GLB,, | Performed by: ANESTHESIOLOGY

## 2022-02-28 PROCEDURE — 1101F PR PT FALLS ASSESS DOC 0-1 FALLS W/OUT INJ PAST YR: ICD-10-PCS | Mod: CPTII,S$GLB,, | Performed by: ANESTHESIOLOGY

## 2022-02-28 PROCEDURE — 1125F AMNT PAIN NOTED PAIN PRSNT: CPT | Mod: CPTII,S$GLB,, | Performed by: ANESTHESIOLOGY

## 2022-02-28 PROCEDURE — 3079F DIAST BP 80-89 MM HG: CPT | Mod: CPTII,S$GLB,, | Performed by: ANESTHESIOLOGY

## 2022-02-28 PROCEDURE — 99214 OFFICE O/P EST MOD 30 MIN: CPT | Mod: S$GLB,,, | Performed by: ANESTHESIOLOGY

## 2022-02-28 PROCEDURE — 3077F PR MOST RECENT SYSTOLIC BLOOD PRESSURE >= 140 MM HG: ICD-10-PCS | Mod: CPTII,S$GLB,, | Performed by: ANESTHESIOLOGY

## 2022-02-28 PROCEDURE — 1125F PR PAIN SEVERITY QUANTIFIED, PAIN PRESENT: ICD-10-PCS | Mod: CPTII,S$GLB,, | Performed by: ANESTHESIOLOGY

## 2022-02-28 PROCEDURE — 1159F PR MEDICATION LIST DOCUMENTED IN MEDICAL RECORD: ICD-10-PCS | Mod: CPTII,S$GLB,, | Performed by: ANESTHESIOLOGY

## 2022-02-28 PROCEDURE — 3008F PR BODY MASS INDEX (BMI) DOCUMENTED: ICD-10-PCS | Mod: CPTII,S$GLB,, | Performed by: ANESTHESIOLOGY

## 2022-02-28 PROCEDURE — 3008F BODY MASS INDEX DOCD: CPT | Mod: CPTII,S$GLB,, | Performed by: ANESTHESIOLOGY

## 2022-02-28 NOTE — PROGRESS NOTES
[facility] Follow Up Evaluation    Referred by: Dr. Willis  Reason for referral: back pain    CC:   Chief Complaint   Patient presents with    Follow-up     1 month f/u    Low-back Pain      Last 3 PDI Scores 1/27/2022 8/23/2021 12/7/2020   Pain Disability Index (PDI) 16 6 21       Interval HPI 2/28/22: Mr Chi returns to the office for follow up.  He is status post left L1-2 and L2-3 TFESI on 02/15/2022 with 50-75% improvement in left anterior thigh pain.  Today he reports he continues to get some left-sided lower back pain however he feels significant improvement in his left anterior thigh pain.    Pain intervention history:   - s/p L5/S1 lumbar ANN-MARIE on 11/20/2020 with 60-70% relief  - s/p L5/S1 ANN-MARIE on 8/4/21 with over 50% relief  - s/p L3-5 posterior spinal fusion MIS, L3-5 instrumentation, L2-3 laminectomy on 11/22/21 with Dr. Willis  - status post left L1-2 and L2-3 TFESI on 02/15/2022 with 50-75% relief    HPI:   Ap Chi is a 66 y.o. male who complains of back pain    Onset: over a year  Inciting Event: none  Progression: since onset, pain is gradually worsening  Current Pain Score: 6/10  Typical Range: 2-8/10  Timing: constant  Quality: aching, throbbing  Radiation: yes, down both legs  Associated numbness or weakness: yes numbness in feet has improved since surgery, yes weakness right leg chronic  Exacerbated by: standing, walking, activity  Allievated by: rest  Is Pain Level Acceptable?: No    Previous Therapies:  PT/OT: yes, in the past  HEP:   Interventions:   Surgery:  Medications:   - NSAIDS:   - MSK Relaxants:   - TCAs:   - SNRIs:   - Topicals:   - Anticonvulsants:  - Opioids:     History:    Current Outpatient Medications:     cetirizine (ZYRTEC) 10 MG tablet, Take 10 mg by mouth as needed for Allergies., Disp: , Rfl:     HYDROcodone-acetaminophen (NORCO) 7.5-325 mg per tablet, Take 1 tablet by mouth every 12 (twelve) hours as needed for Pain., Disp: 60 tablet, Rfl: 0     levothyroxine (SYNTHROID) 137 MCG Tab tablet, Take 137 mcg by mouth once daily. TAKE AM OF SURGERY, Disp: , Rfl:     tiZANidine (ZANAFLEX) 4 MG tablet, Take 1 tablet (4 mg total) by mouth 2 (two) times daily as needed (muscle spasms)., Disp: 40 tablet, Rfl: 1    aspirin (ECOTRIN) 81 MG EC tablet, Take 1 tablet (81 mg total) by mouth once daily., Disp: 90 tablet, Rfl: 3    gabapentin (NEURONTIN) 300 MG capsule, Take 1 capsule (300 mg total) by mouth 3 (three) times daily. (Patient not taking: Reported on 2/28/2022), Disp: 90 capsule, Rfl: 11    pravastatin (PRAVACHOL) 20 MG tablet, Take 1 tablet (20 mg total) by mouth once daily. (Patient not taking: No sig reported), Disp: 90 tablet, Rfl: 3    Past Medical History:   Diagnosis Date    Anticoagulant long-term use     Foot drop, right foot     Hyperlipidemia     Thyroid disease        Past Surgical History:   Procedure Laterality Date    ANTERIOR CERVICAL DISCECTOMY W/ FUSION N/A 9/15/2020    Procedure: DISCECTOMY, SPINE, CERVICAL, ANTERIOR APPROACH, WITH FUSION  C4-5, C5-6, C6-7, C7-1;  Surgeon: Hussain Willis MD;  Location: [facility] OR;  Service: Neurosurgery;  Laterality: N/A;    BACK SURGERY  11/2021    CORONARY ANGIOGRAPHY Left 5/26/2020    Procedure: ANGIOGRAM, CORONARY ARTERY;  Surgeon: Jacobo Mcnair MD;  Location: [facility] CATH;  Service: Cardiology;  Laterality: Left;    EPIDURAL STEROID INJECTION INTO LUMBAR SPINE N/A 11/20/2020    Procedure: Injection-steroid-epidural-lumbar L5/S1;  Surgeon: Ash Weldon MD;  Location: [facility] OR;  Service: Pain Management;  Laterality: N/A;    EPIDURAL STEROID INJECTION INTO LUMBAR SPINE N/A 8/4/2021    Procedure: Injection-steroid-epidural-lumbar L5/S1;  Surgeon: Ash Weldon MD;  Location: [facility] OR;  Service: Pain Management;  Laterality: N/A;    FUSION, SPINE, MINIMALLY INVASIVE, USING COMPUTER-ASSISTED NAVIGATION N/A 11/22/2021    Procedure: FUSION,SPINE,MINIMALLY INVASIVE,USING COMPUTER-ASSISTED NAVIGATION  L3-5;  Surgeon: Hussain Willis MD;  Location: [facility] OR;  Service: Neurosurgery;  Laterality: N/A;    LAMINECTOMY USING MINIMALLY INVASIVE TECHNIQUE N/A 2021    Procedure: LAMINECTOMY, SPINE, MINIMALLY INVASIVE L2-3,;  Surgeon: Hussain Willis MD;  Location: [facility] OR;  Service: Neurosurgery;  Laterality: N/A;    LEFT HEART CATHETERIZATION Left 2020    Procedure: Left heart cath;  Surgeon: Jacobo Mcnair MD;  Location: [facility] CATH;  Service: Cardiology;  Laterality: Left;    NASAL SEPTUM SURGERY      TRANSFORAMINAL EPIDURAL INJECTION OF STEROID Left 2/15/2022    Procedure: Injection,steroid,epidural,transforaminal approach L1/2 + L2/3;  Surgeon: Ash Weldon MD;  Location: [facility] OR;  Service: Pain Management;  Laterality: Left;    VASECTOMY         Family History   Problem Relation Age of Onset    Cancer Mother     Heart disease Father 59        CABG    Stroke Father     Cataracts Father     Heart disease Brother         mild CAD    Multiple sclerosis Sister     Alcohol abuse Brother     Seizures Brother     Glaucoma Neg Hx     Macular degeneration Neg Hx     Retinal detachment Neg Hx        Social History     Socioeconomic History    Marital status:    Tobacco Use    Smoking status: Former Smoker     Quit date:      Years since quittin.1    Smokeless tobacco: Never Used    Tobacco comment: tried as a teen ager.   Substance and Sexual Activity    Alcohol use: Yes     Comment: Rare beer.    Drug use: Never    Sexual activity: Yes     Partners: Female     Social Determinants of Health     Financial Resource Strain: Low Risk     Difficulty of Paying Living Expenses: Not hard at all   Food Insecurity: No Food Insecurity    Worried About Running Out of Food in the Last Year: Never true    Ran Out of Food in the Last Year: Never true   Transportation Needs: No Transportation Needs    Lack of Transportation (Medical): No    Lack of Transportation (Non-Medical): No   Physical  "Activity: Unknown    Days of Exercise per Week: Patient refused   Stress: Unknown    Feeling of Stress : Patient refused   Social Connections: Unknown    Frequency of Communication with Friends and Family: More than three times a week    Frequency of Social Gatherings with Friends and Family: More than three times a week    Active Member of Clubs or Organizations: Yes    Attends Club or Organization Meetings: More than 4 times per year    Marital Status:    Housing Stability: Low Risk     Unable to Pay for Housing in the Last Year: No    Number of Places Lived in the Last Year: 1    Unstable Housing in the Last Year: No       Review of patient's allergies indicates:  No Known Allergies    Review of Systems:  General ROS: negative for - fever  Psychological ROS: negative for - hostility  Hematological and Lymphatic ROS: negative for - bleeding problems  Endocrine ROS: negative for - unexpected weight changes  Respiratory ROS: no cough, shortness of breath, or wheezing  Cardiovascular ROS: no chest pain or dyspnea on exertion  Gastrointestinal ROS: no abdominal pain, change in bowel habits, or black or bloody stools  Musculoskeletal ROS: positive for - muscular weakness  Neurological ROS: positive for - numbness/tingling  Dermatological ROS: negative for rash    Physical Exam:  Vitals:    02/28/22 1111   BP: (!) 158/82   Pulse: 81   Weight: 104.8 kg (230 lb 14.9 oz)   Height: 5' 8" (1.727 m)   PainSc:   3   PainLoc: Back     Body mass index is 35.11 kg/m².     Gen: NAD  Gait: gait intact, ambulating with Rollator assistants  Psych:  Mood appropriate for given condition  HEENT: eyes anicteric   GI: Abd soft  CV: RRR  Lungs: breathing unlabored   ROM: limited AROM of the L spine in all planes, full ROM at ankles, knees and hips  Lumbar flexion 90 degrees, extension 50 degrees, side bending 30 degrees.    Sensation: intact to light touch in all dermatomes tested from L2-S1 bilaterally  Reflexes: 3+ b/l " patella and 3+ b/l achilles  Palpation: Diffusely tender over lumbar paraspinals  -TTP over the b/l greater trochanters and bilateral SI joint  Tone: normal in the b/l knees and hips   Skin: intact  Extremities: No edema in b/l ankles or hands  Provacative tests:        Right Left   L2/3 Iliacus Hip flexion  5  5   L3/4 Qudratus Femoris Knee Extension  5  5   L4/5 Tib Anterior Ankle Dorsiflexion   5  5   L5/S1 Extensor Hallicus Longus Great toe extension  5  5                 S1/S2 Gastroc/Soleus Plantar Flexion  5  5       Imaging:  MRI lumbar spine 8/26/21  FINDINGS:  Alignment: Mild levoconvex curvature of the lumbar spine.  Lumbar lordosis is maintained.  Stable trace retrolisthesis of L1 on L2.     Vertebrae: Vertebral body heights are maintained.  No evidence of an acute fracture or aggressive marrow replacement process. Multilevel mixed degenerative endplate change and anterior marginal osteophyte formation.  Partial osseous fusion across the L5-S1 disc space posteriorly and on the right.     Discs: Multilevel advanced disc degeneration at L3-4, L4-5 and L5-S1 with severe disc space narrowing, prominent degenerative endplate change, and vacuum disc phenomenon.  Moderate to severe disc space narrowing with endplate change and vacuum disc phenomenon also noted at L1-2.     Cord: Normal.  Conus terminates at L1-2.     Degenerative findings:     T11-12: Circumferential disc bulge.  Moderate bilateral facet arthropathy and ligamentum flavum infolding.  Moderate to severe bilateral neural foraminal narrowing and moderate spinal canal stenosis.  T12-L1: Mild bilateral facet arthropathy.  No neural foraminal or spinal canal stenosis.  L1-L2: Retrolisthesis.  Circumferential disc bulge with posterior osteophytic ridging.  Mild bilateral facet arthropathy and ligamentum flavum infolding.  Severe right and moderate left neural foraminal stenosis.  Mild spinal canal stenosis.  L2-L3: Circumferential disc bulge with right  central disc protrusion.  Mild bilateral facet arthropathy and ligamentum flavum infolding.  Dorsal epidural lipomatosis.  Moderate left and mild right neural foraminal stenosis.  Moderate spinal canal stenosis.  L3-L4: Circumferential disc bulge with posterior osteophytic ridging.  Moderate bilateral facet arthropathy and ligamentum flavum infolding.  Severe right and moderate left neural foraminal stenosis.  Mild-to-moderate spinal canal stenosis.  L4-L5: Circumferential disc bulge with posterior osteophytic ridging.  Moderate bilateral facet arthropathy and ligamentum flavum infolding.  Severe right and moderate to severe left neural foraminal stenosis.  Moderate spinal canal stenosis.  Narrowing of the right lateral recess with possible impingement upon the descending right S1 nerve root.  L5-S1: Posterior disc osteophyte complex.  Moderate bilateral facet arthropathy and ligamentum flavum infolding.  Moderate bilateral neural foraminal stenosis.  There is no spinal canal stenosis.     Paraspinal muscles & soft tissues: No significant abnormalities.    Xray lumbar spine 11/24/21  FINDINGS:  The patient has had a posterior fusion procedure involving L3, L4, and L5 in the interim since the prior study.  There are fusion rods in place on each side attached at all 3 levels by pedicle screws.  Disc spacers are also noted.  Vertebral bodies are well aligned.  The hardware appears intact.  There are degenerative changes present with findings consisting of vertebral body osteophyte formation, narrowing of the disc spaces, and irregularity and sclerosis of the facet joints.  There are atherosclerotic changes in the aorta and iliac arteries.     Labs:  BMP  Lab Results   Component Value Date     (L) 11/24/2021    K 4.3 11/24/2021     11/24/2021    CO2 27 11/24/2021    BUN 9 11/24/2021    CREATININE 0.70 11/24/2021    CALCIUM 8.1 (L) 11/24/2021    ANIONGAP 2 (L) 11/24/2021    ESTGFRAFRICA >60 11/24/2021     EGFRNONAA >60 11/24/2021     Lab Results   Component Value Date    ALT 32 05/06/2020    AST 28 05/06/2020    ALKPHOS 97 05/06/2020    BILITOT 0.2 05/06/2020     Lab Results   Component Value Date    WBC 13.24 (H) 11/24/2021    HGB 11.5 (L) 11/24/2021    HCT 35.6 (L) 11/24/2021    MCV 92 11/24/2021     11/24/2021       Assessment:   Problem List Items Addressed This Visit        Neuro    Lumbar radiculopathy - Primary      Other Visit Diagnoses     Myofascial pain              66 y.o. year old male with PMH CAD, hypothyroidism presents to the office with back pain.  He initially presented to neurosurgery with back pain but was found to have cervical myelopathy.  He is now s/p cervical ACDF and has improvement in his pain and also his strength.      2/28/22 - Mr Chi returns to the office for follow up.  He is status post left L1-2 and L2-3 TFESI on 02/15/2022 with 50-75% improvement in left anterior thigh pain.  Today he reports he continues to get some left-sided lower back pain however he feels significant improvement in his left anterior thigh pain.    - on exam he has full strength of his lower extremities  - he has completed home health physical therapy and reports that he continues to do the PT directed home exercises.  - he continues to take hydrocodone sparingly, says he uses it as needed to help him sleep if he is having pain before bed.  - I think his residual pain is likely myofascial on the left lower back.  - he will continue conservative treatment and follow-up as needed    : Not applicable    Ash Weldon M.D.  Interventional Pain Medicine / Anesthesiology

## 2022-03-02 ENCOUNTER — TELEPHONE (OUTPATIENT)
Dept: NEUROSURGERY | Facility: CLINIC | Age: 67
End: 2022-03-02
Payer: MEDICARE

## 2022-03-02 NOTE — TELEPHONE ENCOUNTER
----- Message from Makeda Mello PA-C sent at 3/2/2022  9:23 AM CST -----  Regarding: FW: Advice  Contact: patient  He should wait until after I review the 6 month x-rays   ----- Message -----  From: Caitlin Aden PA-C  Sent: 2/28/2022   5:21 PM CST  To: Makeda Mello PA-C, Leticia Beasley LPN  Subject: RE: Advice                                       I would suggest continuing to hold for 6 months post-op, but will defer to Makeda if they are OK with starting sooner.     ----- Message -----  From: Leticia Beasley LPN  Sent: 2/28/2022   4:41 PM CST  To: Makeda Mello PA-C  Subject: FW: Advice                                       Can he take this as needed every so often or not at all?  ----- Message -----  From: Eneida Argueta  Sent: 2/28/2022  11:00 AM CST  To: [facility] Nixon Staff  Subject: Advice                                           Type: Needs Medical Advice  Who Called:  Patient  Best Call Back Number: [phone] (home)   Additional Information: Patient is requesting a phone call to discuss him starting to take the generic for celebrex, NSAID, Aleve again since he is past the 3 months liz since his surgery. Please contact the patient to advise and discuss. Thanks!

## 2022-05-19 ENCOUNTER — PATIENT MESSAGE (OUTPATIENT)
Dept: NEUROSURGERY | Facility: CLINIC | Age: 67
End: 2022-05-19
Payer: MEDICARE

## 2022-05-19 ENCOUNTER — HOSPITAL ENCOUNTER (OUTPATIENT)
Dept: RADIOLOGY | Facility: HOSPITAL | Age: 67
Discharge: HOME | End: 2022-05-19
Attending: PHYSICIAN ASSISTANT
Payer: MEDICARE

## 2022-05-19 DIAGNOSIS — Z98.1 S/P LUMBAR FUSION: ICD-10-CM

## 2022-05-19 PROCEDURE — 72100 XR LUMBAR SPINE AP AND LATERAL: ICD-10-PCS | Mod: 26,,, | Performed by: RADIOLOGY

## 2022-05-19 PROCEDURE — 72100 X-RAY EXAM L-S SPINE 2/3 VWS: CPT | Mod: 26,,, | Performed by: RADIOLOGY

## 2022-05-19 PROCEDURE — 72100 X-RAY EXAM L-S SPINE 2/3 VWS: CPT | Mod: TC,FY,PO

## 2022-07-05 PROBLEM — E66.01 SEVERE OBESITY (BMI 35.0-39.9) WITH COMORBIDITY: Status: ACTIVE | Noted: 2022-07-05

## 2022-08-15 NOTE — TELEPHONE ENCOUNTER
Patient requested a prescription for Tizandine 4 mg, 1 tablet  Po tid as needed for muscle spasms

## 2022-09-30 NOTE — PROGRESS NOTES
Left TMA - 2019  Right BKA  Both amputation incision sites clean, healed, and intact     Ortho advising patient he may need a left BKA  9/25/22 Ortho is recommending a BKA per vascular surgery. Awaiting Vascular surgery recs. Continue current management with IV ABX.  9/30: Vascular Surgery recommends continue plan for 6w of IV antibiotics with wound care, if no improvement of clinical worsening, proceed with Left BKA.     [facility] Follow Up Evaluation    Referred by: Dr. Willis  Reason for referral: back pain    CC:   Chief Complaint   Patient presents with    Low-back Pain    Leg Pain     both      Last 3 PDI Scores 12/7/2020 11/12/2020   Pain Disability Index (PDI) 21 31     Interval HPI 12/07/20:  Mr Chi returns to clinic for follow up.  He is s/p L5/S1 lumbar interlaminar epidural steroid injection with 60-70% relief.  He complains of pain 3/10, increasing with activity, constant, aching, with radiation posteriorly into the bilateral buttock and thighs.  The pain is worse in the morning, he complains of stiffness, and cramping in his calves - this is very bothersome for him.   He reports numbness about the right lateral calf, and weakness intermittently in his bilateral lower extremities, he denies bowel or bladder dysfunction.  He has completed formal physical therapy and continues home exercise program.  He takes Tylenol p.r.n., he is advised to avoid NSAIDs per Dr. Willis, he is  s/p cervical ACDF on 9/15/20.     Neurosurgery notes:  Dr. Willis, 10/29/20 - 6 weeks post op  5-1 ACDF 6wk 6 moxray, Back and leg pain, non specific ddd Pain mg referral ,PT offerred      HPI:   Ap Chi is a 65 y.o. male who complains of back pain    Onset: over a year  Inciting Event: none  Progression: since onset, pain is gradually worsening  Current Pain Score: 6/10  Typical Range: 2-8/10  Timing: constant  Quality: aching, throbbing  Radiation: yes, down both legs  Associated numbness or weakness: yes numbness in feet has improved since surgery, yes weakness right leg chronic  Exacerbated by: standing, walking, activity  Allievated by: rest  Is Pain Level Acceptable?: No    Previous Therapies:  PT/OT: yes, in the past  HEP:   Interventions:   Surgery:  Medications:   - NSAIDS:   - MSK Relaxants:   - TCAs:   - SNRIs:   - Topicals:   - Anticonvulsants:  - Opioids:     History:    Current Outpatient Medications:      aspirin (ECOTRIN) 81 MG EC tablet, Take 1 tablet (81 mg total) by mouth once daily., Disp: 90 tablet, Rfl: 3    levothyroxine (SYNTHROID) 137 MCG Tab tablet, Take 137 mcg by mouth once daily. TAKE AM OF SURGERY, Disp: , Rfl:     rosuvastatin (CRESTOR) 20 MG tablet, Take 1 tablet (20 mg total) by mouth once daily. (Patient taking differently: Take 20 mg by mouth once daily. TAKE AM OF SURGERY), Disp: 90 tablet, Rfl: 3    oxyCODONE-acetaminophen (PERCOCET) 7.5-325 mg per tablet, Take 1 tablet by mouth every 6 (six) hours as needed for Pain. (Patient not taking: Reported on 11/12/2020), Disp: 28 tablet, Rfl: 0    Past Medical History:   Diagnosis Date    Anticoagulant long-term use     Foot drop, right foot     Hyperlipidemia     Thyroid disease        Past Surgical History:   Procedure Laterality Date    ANTERIOR CERVICAL DISCECTOMY W/ FUSION N/A 9/15/2020    Procedure: DISCECTOMY, SPINE, CERVICAL, ANTERIOR APPROACH, WITH FUSION  C4-5, C5-6, C6-7, C7-1;  Surgeon: Hussain Willis MD;  Location: [facility] OR;  Service: Neurosurgery;  Laterality: N/A;    CORONARY ANGIOGRAPHY Left 5/26/2020    Procedure: ANGIOGRAM, CORONARY ARTERY;  Surgeon: Jacobo Mcnair MD;  Location: [facility] CATH;  Service: Cardiology;  Laterality: Left;    EPIDURAL STEROID INJECTION INTO LUMBAR SPINE N/A 11/20/2020    Procedure: Injection-steroid-epidural-lumbar L5/S1;  Surgeon: Ash Weldon MD;  Location: [facility] OR;  Service: Pain Management;  Laterality: N/A;    LEFT HEART CATHETERIZATION Left 5/26/2020    Procedure: Left heart cath;  Surgeon: Jacobo Mcnair MD;  Location: [facility] CATH;  Service: Cardiology;  Laterality: Left;    NASAL SEPTUM SURGERY      VASECTOMY         Family History   Problem Relation Age of Onset    Cancer Mother     Heart disease Father 59        CABG    Stroke Father     Heart disease Brother         mild CAD       Social History     Socioeconomic History    Marital status:      Spouse name: Not on file    Number  of children: Not on file    Years of education: Not on file    Highest education level: Not on file   Occupational History    Not on file   Social Needs    Financial resource strain: Not hard at all    Food insecurity     Worry: Never true     Inability: Never true    Transportation needs     Medical: No     Non-medical: No   Tobacco Use    Smoking status: Former Smoker     Quit date:      Years since quittin.9    Smokeless tobacco: Never Used    Tobacco comment: tried as a teen ager.   Substance and Sexual Activity    Alcohol use: Yes     Frequency: Monthly or less     Drinks per session: 1 or 2     Binge frequency: Never     Comment: Rare beer.    Drug use: Never    Sexual activity: Yes     Partners: Female   Lifestyle    Physical activity     Days per week: 0 days     Minutes per session: Not on file    Stress: Not at all   Relationships    Social connections     Talks on phone: More than three times a week     Gets together: More than three times a week     Attends Christianity service: Not on file     Active member of club or organization: Yes     Attends meetings of clubs or organizations: More than 4 times per year     Relationship status:    Other Topics Concern    Not on file   Social History Narrative    Not on file       Review of patient's allergies indicates:  No Known Allergies    Review of Systems:  General ROS: negative for - fever  Psychological ROS: negative for - hostility  Hematological and Lymphatic ROS: negative for - bleeding problems  Endocrine ROS: negative for - unexpected weight changes  Respiratory ROS: no cough, shortness of breath, or wheezing  Cardiovascular ROS: no chest pain or dyspnea on exertion  Gastrointestinal ROS: no abdominal pain, change in bowel habits, or black or bloody stools  Musculoskeletal ROS: positive for - muscular weakness  Neurological ROS: positive for - numbness/tingling  Dermatological ROS: negative for rash    Physical  "Exam:  Vitals:    12/07/20 0936   BP: 132/77   Pulse: 88   Temp: 97.7 °F (36.5 °C)   TempSrc: Temporal   SpO2: 95%   Weight: 108.5 kg (239 lb 3.2 oz)   Height: 5' 8" (1.727 m)   PainSc:   3   PainLoc: Back     Body mass index is 36.37 kg/m².     Gen: NAD  Gait: gait intact, ambulating with Rollator assistants  Psych:  Mood appropriate for given condition  HEENT: eyes anicteric   GI: Abd soft  CV: RRR  Lungs: breathing unlabored   ROM: limited AROM of the L spine in all planes, full ROM at ankles, knees and hips  Lumbar flexion 90 degrees, extension 50 degrees, side bending 30 degrees.    Sensation: intact to light touch in all dermatomes tested from L2-S1 bilaterally  Reflexes: 3+ b/l patella and 2+ b/l achilles, morrow's positive b/l  Palpation: Diffusely tender over lumbar paraspinals  -TTP over the b/l greater trochanters and bilateral SI joint  Tone: normal in the b/l knees and hips   Skin: intact  Extremities: No edema in b/l ankles or hands  Provacative tests: + bilateral axial facet loading       Right Left   L2/3 Iliacus Hip flexion  5-  5-   L3/4 Qudratus Femoris Knee Extension  5  5   L4/5 Tib Anterior Ankle Dorsiflexion   5  5   L5/S1 Extensor Hallicus Longus Great toe extension  4  4                 S1/S2 Gastroc/Soleus Plantar Flexion  5  5       Imaging:  MRI lumbar spine 7/18/20  FINDINGS:  The lumbar vertebral body heights appear to be maintained.  There appears appears to be 3-4 mm retrolisthesis of L1 on L2.  Scattered discogenic endplate degenerative signal changes are seen, most pronounced at the L3-L4 through L5-S1 levels.  No suspicious bone marrow edema suggestive of acute fracture is visualized.  There is severe disc space narrowing at L5-S1 with evidence of partial fusion at the posterior L5-S1 level.   The conus medullaris terminate at approximately the L1-L2 level.     L1-L2 demonstrates severe disc space narrowing, disc desiccation, moderate broad-based disc bulge, ligamentum flavum " hypertrophy, and mild bilateral facet arthrosis without significant overall central spinal canal stenosis.  Mild to moderate bilateral lateral recess narrowing is seen.  Moderate to severe right and moderate left neural foraminal narrowing is noted.  L2-L3 demonstrates mild disc height loss, disc desiccation, moderate broad-based disc bulge with superimposed shallow right paracentral broad-based disc protrusion, ligamentum flavum hypertrophy, and mild-to-moderate bilateral facet arthrosis.  Mild central spinal canal narrowing is seen.  Mild to moderate bilateral lateral recess narrowing is seen.  Moderate left and mild right neural foraminal narrowing is noted.  L3-L4 demonstrates severe disc space narrowing, severe broad-based disc bulge with superimposed endplate marginal osteophytes asymmetric slightly to the right, ligamentum flavum hypertrophy, and moderate bilateral facet arthrosis.  Mild central spinal canal narrowing is seen.  Severe bilateral lateral recess narrowing is seen.  Moderate left and severe right neural foraminal stenosis is seen.  L4-L5 demonstrates severe disc space narrowing, severe broad-based disc bulge with superimposed endplate marginal osteophytes, ligamentum flavum hypertrophy, and moderate to severe bilateral facet arthrosis.  Mild central spinal canal stenosis is seen.  Severe bilateral lateral recess narrowing and severe bilateral neural foraminal narrowing is seen.  L5-S1 demonstrates severe disc space narrowing with suspected partial posterior fusion.  Posterior moderate endplate spurring is seen.  Moderate right greater than left facet arthrosis is noted.  No significant overall central spinal canal stenosis is appreciated.  Moderate bilateral lateral recess narrowing is noted.  Moderate to severe bilateral neural foraminal stenosis is seen.     Labs:  BMP  Lab Results   Component Value Date     09/16/2020    K 4.2 09/16/2020     09/16/2020    CO2 24 09/16/2020    BUN  11 09/16/2020    CREATININE 0.68 09/16/2020    CALCIUM 8.5 09/16/2020    ANIONGAP 7 (L) 09/16/2020    ESTGFRAFRICA >60 09/16/2020    EGFRNONAA >60 09/16/2020     Lab Results   Component Value Date    ALT 32 05/06/2020    AST 28 05/06/2020    ALKPHOS 97 05/06/2020    BILITOT 0.2 05/06/2020     Lab Results   Component Value Date    WBC 12.69 09/16/2020    HGB 13.6 (L) 09/16/2020    HCT 41.1 09/16/2020    MCV 89 09/16/2020     09/16/2020       Assessment:   Problem List Items Addressed This Visit        Neuro    Lumbar radiculopathy      Other Visit Diagnoses     Lumbar spondylosis    -  Primary    Chronic pain disorder        Spinal stenosis of lumbar region, unspecified whether neurogenic claudication present        History of fusion of cervical spine              65 y.o. year old male with PMH CAD, hypothyroidism presents to the office with back pain.  He initially presented to neurosurgery with back pain but was found to have cervical myelopathy.  He is now s/p cervical ACDF and has improvement in his pain and also his strength.      12/7/20 - Mr Chi returns to clinic for follow up.  He is s/p L5/S1 lumbar interlaminar epidural steroid injection with 60-70% relief.  He complains of pain 3/10, increasing with activity, constant, aching, with radiation posteriorly into the bilateral buttock and thighs.  The pain is worse in the morning, he complains of stiffness, and cramping in his calves - this is very bothersome for him.   He reports numbness about the right lateral calf, and weakness intermittently in his bilateral lower extremities, he denies bowel or bladder dysfunction.  He has completed formal physical therapy and continues home exercise program.  He takes Tylenol p.r.n., he is advised to avoid NSAIDs per Dr. Willis, he is  s/p cervical ACDF on 9/15/20.  On exam today, he has 5- pain limited b/l hip flexion and 4/5 b/l EHL that he states has been chronic, sensation intact to light touch L2-S1, 3+  b/l patellar, and 2+ b/l achilles DTRs, pain is reproducible with axial facet loading.  We reviewed his lumbar spine MRI together today,  c/w L2-3, L3-4 and L4-5 mild central canal stenosis, multilevel bilateral facet arthropathy and foraminal narrowing.  His axial low back is facet mediated and the cramping pain he describes is secondary to spinal stenosis.  His pain is limiting his mobility and interfering with his ADL's.  Dr. Willis recommended pain management at this time for his low back pain, no follow-up scheduled.  He had significant improvement with ANN-MARIE of his radicular symptoms.  I have recommended diagnostic medial branch blocks followed by subsequent RFA if appropriate.  He is concerned about procedure co-pay costs and will take some time to discuss with his wife.  We also discussed option of spinal cord stimulation with Leigh in the future if conservative treatment fails and no further surgical treatment indicated per Neurosurgery.  He will consider options and follow up prn.    Treatment Plan:   Procedures: none  PT/OT/HEP: continue home exercises  Medications:  Continue as prescribed   Labs: Reviewed and medications are appropriately dosed for current hepatorenal function.  Imaging: No additional recommended at this time.    : Not applicable    Attending Physician:  Ash Weldon M.D.  Interventional Pain Medicine / Anesthesiology

## 2023-04-18 PROBLEM — M23.203 DEGENERATIVE TEAR OF MEDIAL MENISCUS OF RIGHT KNEE: Status: ACTIVE | Noted: 2023-04-18

## 2023-05-11 NOTE — PROGRESS NOTES
[facility] Follow Up Evaluation    Referred by: Dr. iWllis  Reason for referral: back pain    CC:   Chief Complaint   Patient presents with    Follow-up    Low-back Pain      Last 3 PDI Scores 1/27/2022 8/23/2021 12/7/2020   Pain Disability Index (PDI) 16 6 21       Interval HPI 1/27/22: Mr Chi returns to the office for follow up.  Since I last saw him he is now s/p L3-5 posterior spinal fusion MIS, L3-5 instrumentation, L2-3 laminectomy on 11/22/21 with Dr. Willis.    Today he reports pain into the left groin and left anterior thigh, aching, deep, numb, constant.  Right leg symptoms improved following surgery.  He has been taking oxycodone and tizanidine.  Avoids NSAIDs due to his fusion.      Pain intervention history:   - s/p L5/S1 lumbar ANN-MARIE on 11/20/2020 with 60-70% relief  - s/p L5/S1 ANN-MARIE on 8/4/21 with over 50% relief  - s/p L3-5 posterior spinal fusion MIS, L3-5 instrumentation, L2-3 laminectomy on 11/22/21 with Dr. Willis    HPI:   Ap Chi is a 66 y.o. male who complains of back pain    Onset: over a year  Inciting Event: none  Progression: since onset, pain is gradually worsening  Current Pain Score: 6/10  Typical Range: 2-8/10  Timing: constant  Quality: aching, throbbing  Radiation: yes, down both legs  Associated numbness or weakness: yes numbness in feet has improved since surgery, yes weakness right leg chronic  Exacerbated by: standing, walking, activity  Allievated by: rest  Is Pain Level Acceptable?: No    Previous Therapies:  PT/OT: yes, in the past  HEP:   Interventions:   Surgery:  Medications:   - NSAIDS:   - MSK Relaxants:   - TCAs:   - SNRIs:   - Topicals:   - Anticonvulsants:  - Opioids:     History:    Current Outpatient Medications:     aspirin (ECOTRIN) 81 MG EC tablet, Take 1 tablet (81 mg total) by mouth once daily., Disp: 90 tablet, Rfl: 3    cetirizine (ZYRTEC) 10 MG tablet, Take 10 mg by mouth as needed for Allergies., Disp: , Rfl:     gabapentin  Patient did not appear for scheduled visit   (NEURONTIN) 300 MG capsule, Take 1 capsule (300 mg total) by mouth 3 (three) times daily., Disp: 90 capsule, Rfl: 11    HYDROcodone-acetaminophen (NORCO) 7.5-325 mg per tablet, Take 1 tablet by mouth every 12 (twelve) hours as needed for Pain., Disp: 60 tablet, Rfl: 0    levothyroxine (SYNTHROID) 137 MCG Tab tablet, Take 137 mcg by mouth once daily. TAKE AM OF SURGERY, Disp: , Rfl:     pravastatin (PRAVACHOL) 20 MG tablet, Take 1 tablet (20 mg total) by mouth once daily. (Patient not taking: No sig reported), Disp: 90 tablet, Rfl: 3    tiZANidine (ZANAFLEX) 4 MG tablet, Take 1 tablet (4 mg total) by mouth 2 (two) times daily as needed (muscle spasms)., Disp: 40 tablet, Rfl: 1    Past Medical History:   Diagnosis Date    Anticoagulant long-term use     Foot drop, right foot     Hyperlipidemia     Thyroid disease        Past Surgical History:   Procedure Laterality Date    ANTERIOR CERVICAL DISCECTOMY W/ FUSION N/A 9/15/2020    Procedure: DISCECTOMY, SPINE, CERVICAL, ANTERIOR APPROACH, WITH FUSION  C4-5, C5-6, C6-7, C7-1;  Surgeon: Hussain Willis MD;  Location: [facility] OR;  Service: Neurosurgery;  Laterality: N/A;    CORONARY ANGIOGRAPHY Left 5/26/2020    Procedure: ANGIOGRAM, CORONARY ARTERY;  Surgeon: Jacobo Mcnair MD;  Location: [facility] CATH;  Service: Cardiology;  Laterality: Left;    EPIDURAL STEROID INJECTION INTO LUMBAR SPINE N/A 11/20/2020    Procedure: Injection-steroid-epidural-lumbar L5/S1;  Surgeon: Ash Weldon MD;  Location: [facility] OR;  Service: Pain Management;  Laterality: N/A;    EPIDURAL STEROID INJECTION INTO LUMBAR SPINE N/A 8/4/2021    Procedure: Injection-steroid-epidural-lumbar L5/S1;  Surgeon: Ash Weldon MD;  Location: [facility] OR;  Service: Pain Management;  Laterality: N/A;    FUSION, SPINE, MINIMALLY INVASIVE, USING COMPUTER-ASSISTED NAVIGATION N/A 11/22/2021    Procedure: FUSION,SPINE,MINIMALLY INVASIVE,USING COMPUTER-ASSISTED NAVIGATION L3-5;  Surgeon: Hussain Willis MD;  Location:  [facility] OR;  Service: Neurosurgery;  Laterality: N/A;    LAMINECTOMY USING MINIMALLY INVASIVE TECHNIQUE N/A 2021    Procedure: LAMINECTOMY, SPINE, MINIMALLY INVASIVE L2-3,;  Surgeon: Hussain Willis MD;  Location: [facility] OR;  Service: Neurosurgery;  Laterality: N/A;    LEFT HEART CATHETERIZATION Left 2020    Procedure: Left heart cath;  Surgeon: Jacobo Mcnair MD;  Location: [facility] CATH;  Service: Cardiology;  Laterality: Left;    NASAL SEPTUM SURGERY      VASECTOMY         Family History   Problem Relation Age of Onset    Cancer Mother     Heart disease Father 59        CABG    Stroke Father     Cataracts Father     Heart disease Brother         mild CAD    Multiple sclerosis Sister     Alcohol abuse Brother     Seizures Brother     Glaucoma Neg Hx     Macular degeneration Neg Hx     Retinal detachment Neg Hx        Social History     Socioeconomic History    Marital status:    Tobacco Use    Smoking status: Former Smoker     Quit date:      Years since quittin.1    Smokeless tobacco: Never Used    Tobacco comment: tried as a teen ager.   Substance and Sexual Activity    Alcohol use: Yes     Comment: Rare beer.    Drug use: Never    Sexual activity: Yes     Partners: Female     Social Determinants of Health     Financial Resource Strain: Low Risk     Difficulty of Paying Living Expenses: Not hard at all   Food Insecurity: No Food Insecurity    Worried About Running Out of Food in the Last Year: Never true    Ran Out of Food in the Last Year: Never true   Transportation Needs: No Transportation Needs    Lack of Transportation (Medical): No    Lack of Transportation (Non-Medical): No   Physical Activity: Unknown    Days of Exercise per Week: Patient refused   Stress: Unknown    Feeling of Stress : Patient refused   Social Connections: Unknown    Frequency of Communication with Friends and Family: More than three times a week    Frequency of Social Gatherings with  Friends and Family: More than three times a week    Active Member of Clubs or Organizations: Yes    Attends Club or Organization Meetings: More than 4 times per year    Marital Status:    Housing Stability: Low Risk     Unable to Pay for Housing in the Last Year: No    Number of Places Lived in the Last Year: 1    Unstable Housing in the Last Year: No       Review of patient's allergies indicates:  No Known Allergies    Review of Systems:  General ROS: negative for - fever  Psychological ROS: negative for - hostility  Hematological and Lymphatic ROS: negative for - bleeding problems  Endocrine ROS: negative for - unexpected weight changes  Respiratory ROS: no cough, shortness of breath, or wheezing  Cardiovascular ROS: no chest pain or dyspnea on exertion  Gastrointestinal ROS: no abdominal pain, change in bowel habits, or black or bloody stools  Musculoskeletal ROS: positive for - muscular weakness  Neurological ROS: positive for - numbness/tingling  Dermatological ROS: negative for rash    Physical Exam:  Vitals:    01/27/22 1119   BP: (!) 148/77   Pulse: 88   Resp: 18   Temp: 97.8 °F (36.6 °C)   TempSrc: Oral   SpO2: 100%   Weight: 101 kg (222 lb 10.6 oz)   PainSc:   6   PainLoc: Back     Body mass index is 33.86 kg/m².     Gen: NAD  Gait: gait intact, ambulating with Rollator assistants  Psych:  Mood appropriate for given condition  HEENT: eyes anicteric   GI: Abd soft  CV: RRR  Lungs: breathing unlabored   ROM: limited AROM of the L spine in all planes, full ROM at ankles, knees and hips  Lumbar flexion 90 degrees, extension 50 degrees, side bending 30 degrees.    Sensation: intact to light touch in all dermatomes tested from L2-S1 bilaterally, except decreased over left proximal thigh  Reflexes: 3+ b/l patella and 3+ b/l achilles  Palpation: Diffusely tender over lumbar paraspinals  -TTP over the b/l greater trochanters and bilateral SI joint  Tone: normal in the b/l knees and hips   Skin:  intact  Extremities: No edema in b/l ankles or hands  Provacative tests:        Right Left   L2/3 Iliacus Hip flexion  5  5   L3/4 Qudratus Femoris Knee Extension  5  5   L4/5 Tib Anterior Ankle Dorsiflexion   5  5   L5/S1 Extensor Hallicus Longus Great toe extension  5  5                 S1/S2 Gastroc/Soleus Plantar Flexion  5  5       Imaging:  MRI lumbar spine 8/26/21  FINDINGS:  Alignment: Mild levoconvex curvature of the lumbar spine.  Lumbar lordosis is maintained.  Stable trace retrolisthesis of L1 on L2.     Vertebrae: Vertebral body heights are maintained.  No evidence of an acute fracture or aggressive marrow replacement process. Multilevel mixed degenerative endplate change and anterior marginal osteophyte formation.  Partial osseous fusion across the L5-S1 disc space posteriorly and on the right.     Discs: Multilevel advanced disc degeneration at L3-4, L4-5 and L5-S1 with severe disc space narrowing, prominent degenerative endplate change, and vacuum disc phenomenon.  Moderate to severe disc space narrowing with endplate change and vacuum disc phenomenon also noted at L1-2.     Cord: Normal.  Conus terminates at L1-2.     Degenerative findings:     T11-12: Circumferential disc bulge.  Moderate bilateral facet arthropathy and ligamentum flavum infolding.  Moderate to severe bilateral neural foraminal narrowing and moderate spinal canal stenosis.  T12-L1: Mild bilateral facet arthropathy.  No neural foraminal or spinal canal stenosis.  L1-L2: Retrolisthesis.  Circumferential disc bulge with posterior osteophytic ridging.  Mild bilateral facet arthropathy and ligamentum flavum infolding.  Severe right and moderate left neural foraminal stenosis.  Mild spinal canal stenosis.  L2-L3: Circumferential disc bulge with right central disc protrusion.  Mild bilateral facet arthropathy and ligamentum flavum infolding.  Dorsal epidural lipomatosis.  Moderate left and mild right neural foraminal stenosis.  Moderate  spinal canal stenosis.  L3-L4: Circumferential disc bulge with posterior osteophytic ridging.  Moderate bilateral facet arthropathy and ligamentum flavum infolding.  Severe right and moderate left neural foraminal stenosis.  Mild-to-moderate spinal canal stenosis.  L4-L5: Circumferential disc bulge with posterior osteophytic ridging.  Moderate bilateral facet arthropathy and ligamentum flavum infolding.  Severe right and moderate to severe left neural foraminal stenosis.  Moderate spinal canal stenosis.  Narrowing of the right lateral recess with possible impingement upon the descending right S1 nerve root.  L5-S1: Posterior disc osteophyte complex.  Moderate bilateral facet arthropathy and ligamentum flavum infolding.  Moderate bilateral neural foraminal stenosis.  There is no spinal canal stenosis.     Paraspinal muscles & soft tissues: No significant abnormalities.    Xray lumbar spine 11/24/21  FINDINGS:  The patient has had a posterior fusion procedure involving L3, L4, and L5 in the interim since the prior study.  There are fusion rods in place on each side attached at all 3 levels by pedicle screws.  Disc spacers are also noted.  Vertebral bodies are well aligned.  The hardware appears intact.  There are degenerative changes present with findings consisting of vertebral body osteophyte formation, narrowing of the disc spaces, and irregularity and sclerosis of the facet joints.  There are atherosclerotic changes in the aorta and iliac arteries.     Labs:  BMP  Lab Results   Component Value Date     (L) 11/24/2021    K 4.3 11/24/2021     11/24/2021    CO2 27 11/24/2021    BUN 9 11/24/2021    CREATININE 0.70 11/24/2021    CALCIUM 8.1 (L) 11/24/2021    ANIONGAP 2 (L) 11/24/2021    ESTGFRAFRICA >60 11/24/2021    EGFRNONAA >60 11/24/2021     Lab Results   Component Value Date    ALT 32 05/06/2020    AST 28 05/06/2020    ALKPHOS 97 05/06/2020    BILITOT 0.2 05/06/2020     Lab Results   Component Value  Date    WBC 13.24 (H) 11/24/2021    HGB 11.5 (L) 11/24/2021    HCT 35.6 (L) 11/24/2021    MCV 92 11/24/2021     11/24/2021       Assessment:   Problem List Items Addressed This Visit        Neuro    Lumbar radiculopathy    Relevant Medications    HYDROcodone-acetaminophen (NORCO) 7.5-325 mg per tablet      Other Visit Diagnoses     Myofascial pain    -  Primary    Relevant Medications    tiZANidine (ZANAFLEX) 4 MG tablet          66 y.o. year old male with PMH CAD, hypothyroidism presents to the office with back pain.  He initially presented to neurosurgery with back pain but was found to have cervical myelopathy.  He is now s/p cervical ACDF and has improvement in his pain and also his strength.      1/27/22 - Mr Chi returns to the office for follow up.  Since I last saw him he is now s/p L3-5 posterior spinal fusion MIS, L3-5 instrumentation, L2-3 laminectomy on 11/22/21 with Dr. Willis.    Today he reports pain into the left groin and left anterior thigh, aching, deep, numb, constant.  Right leg symptoms improved following surgery.  He has been taking oxycodone and tizanidine.  Avoids NSAIDs due to his fusion.      - on exam he has full strength of his lower extremities, decreased sensation to light touch over the proximal left thigh  - hydrocodone 7.5/325mg po bid prn for severe pain.   - tizanidine 4mg po bid prn for myofascial pain   - I independently reviewed his lumbar MRI and he has L1-2 moderate left neural foraminal stenosis and L2-L3 dorsal epidural lipomatosis with moderate left neural foraminal stenosis and moderate spinal canal stenosis  - His pain is limiting her mobility and interfering with her ADL's.  - we will schedule for a left L1/2 and L2/3 TFESI.  - follow up 2 weeks post injection.     : Not applicable    Ash Weldon M.D.  Interventional Pain Medicine / Anesthesiology

## 2023-05-23 ENCOUNTER — PATIENT MESSAGE (OUTPATIENT)
Dept: RESEARCH | Facility: HOSPITAL | Age: 68
End: 2023-05-23
Payer: MEDICARE

## 2023-07-27 ENCOUNTER — HOSPITAL ENCOUNTER (OUTPATIENT)
Dept: RADIOLOGY | Facility: HOSPITAL | Age: 68
Discharge: HOME | End: 2023-07-27
Attending: FAMILY MEDICINE
Payer: MEDICARE

## 2023-07-27 DIAGNOSIS — Z13.6 ENCOUNTER FOR ABDOMINAL AORTIC ANEURYSM (AAA) SCREENING: ICD-10-CM

## 2023-07-27 PROCEDURE — 76775 US EXAM ABDO BACK WALL LIM: CPT | Mod: 26,,, | Performed by: RADIOLOGY

## 2023-07-27 PROCEDURE — 76775 US ABDOMINAL AORTA: ICD-10-PCS | Mod: 26,,, | Performed by: RADIOLOGY

## 2023-07-27 PROCEDURE — 76775 US EXAM ABDO BACK WALL LIM: CPT | Mod: TC,PO

## 2023-10-03 ENCOUNTER — PATIENT MESSAGE (OUTPATIENT)
Dept: NEUROSURGERY | Facility: CLINIC | Age: 68
End: 2023-10-03
Payer: MEDICARE

## 2023-12-18 ENCOUNTER — TELEPHONE (OUTPATIENT)
Dept: RESEARCH | Facility: OTHER | Age: 68
End: 2023-12-18
Payer: MEDICARE

## 2023-12-18 NOTE — TELEPHONE ENCOUNTER
Study Title: Transcending COVID-19 barriers to pain care in [place]: Pragmatic comparative effectiveness trial of evidence-based, on-demand, digital behavioral treatments for chronic pain.  Sponsor:  [facility]   Study: [facility] Digital Pain Treatment Study - Transcending COVID-19 barriers to pain care in [place]: Pragmatic comparative effectiveness trial of evidence-based, on-demand, digital behavioral treatments for chronic pain.   IRB/Protocol #: 2021.177 - Phase 2?  Study#([facility]):  [id number]  Principle Investigator - Samuel Pizano   Sub-Investigator - Wolfgang Baer   Sponsor:  [facility] Screening Interest in Study Call    Attempt #: 1, 2, 3+: 1      1. Contact Made: []Yes [x]No   1A. If yes, contact date:   1B. If no, date of final contact attempt:   1C. If no, reason(s) contact not made:  []Wrong number []No response [x]Other   1D. If other, please specify: left voicemail    2. Verbal commitment: []Yes  [x]No  2A. If yes, verbal commitment date:   2B. If no, reason: []Exclusion Criteria Met  []Desire not to participate []No reason provided [x]Other  2B1. If Type of Exclusion Criteria Met or other reason, specify: left voicemail    I left a detailed message for the subject to call the office back at [phone]. This is my first attempt to reach the subject.

## 2023-12-27 ENCOUNTER — TELEPHONE (OUTPATIENT)
Dept: RESEARCH | Facility: OTHER | Age: 68
End: 2023-12-27
Payer: MEDICARE

## 2023-12-27 NOTE — TELEPHONE ENCOUNTER
Study Title: Transcending COVID-19 barriers to pain care in [place]: Pragmatic comparative effectiveness trial of evidence-based, on-demand, digital behavioral treatments for chronic pain.  Sponsor:  [email or web address]   Study: [email or web address] Digital Pain Treatment Study - Transcending COVID-19 barriers to pain care in [place]: Pragmatic comparative effectiveness trial of evidence-based, on-demand, digital behavioral treatments for chronic pain.   IRB/Protocol #: 2021.177 - Phase 2?  Study#([facility]):  [id number]  Principle Investigator - Samuel Pizano   Sub-Investigator - Wolfgang Baer   Sponsor:  [facility] Screening Interest in Study Call    Attempt #: 1, 2, 3+: 2      1. Contact Made: []Yes [x]No   1A. If yes, contact date:   1B. If no, date of final contact attempt:   1C. If no, reason(s) contact not made:  []Wrong number []No response [x]Other   1D. If other, please specify: left voicemail    2. Verbal commitment: []Yes  [x]No  2A. If yes, verbal commitment date:   2B. If no, reason: []Exclusion Criteria Met  []Desire not to participate []No reason provided [x]Other  2B1. If Type of Exclusion Criteria Met or other reason, specify: left voicemail    I left a detailed message for the subject to call the office back at [phone]. This is my second attempt to reach this subject.

## 2024-01-02 ENCOUNTER — TELEPHONE (OUTPATIENT)
Dept: RESEARCH | Facility: OTHER | Age: 69
End: 2024-01-02
Payer: MEDICARE

## 2024-01-02 NOTE — TELEPHONE ENCOUNTER
Study Title: Transcending COVID-19 barriers to pain care in [place]: Pragmatic comparative effectiveness trial of evidence-based, on-demand, digital behavioral treatments for chronic pain.  Sponsor:  [facility]   Study: [facility] Digital Pain Treatment Study - Transcending COVID-19 barriers to pain care in [place]: Pragmatic comparative effectiveness trial of evidence-based, on-demand, digital behavioral treatments for chronic pain.   IRB/Protocol #: 2021.177 - Phase 2?  Study#([facility]):  [id number]  Principle Investigator - Samuel Pizano   Sub-Investigator - Wolfgang Baer   Sponsor:  [facility] Screening Interest in Study Call    Attempt #: 1, 2, 3+: 3+      1. Contact Made: []Yes [x]No   1A. If yes, contact date:   1B. If no, date of final contact attempt: [id number]  1C. If no, reason(s) contact not made:  []Wrong number [x]No response []Other   1D. If other, please specify: left voicemail    2. Verbal commitment: []Yes  No  2A. If yes, verbal commitment date:   2B. If no, reason: []Exclusion Criteria Met  []Desire not to participate []No reason provided [x]Other  2B1. If Type of Exclusion Criteria Met or other reason, specify: left voicemail    I left a detailed message for the subject to call the office back at [phone]. This is my third & final call to this subject.

## 2024-04-04 PROBLEM — J41.1 BRONCHITIS, MUCOPURULENT RECURRENT: Status: ACTIVE | Noted: 2024-04-04

## 2024-04-10 ENCOUNTER — OFFICE VISIT (OUTPATIENT)
Dept: NEUROSURGERY | Facility: CLINIC | Age: 69
End: 2024-04-10
Payer: MEDICARE

## 2024-04-10 VITALS
SYSTOLIC BLOOD PRESSURE: 152 MMHG | HEIGHT: 68 IN | WEIGHT: 241.63 LBS | DIASTOLIC BLOOD PRESSURE: 88 MMHG | BODY MASS INDEX: 36.62 KG/M2 | RESPIRATION RATE: 18 BRPM | HEART RATE: 75 BPM

## 2024-04-10 DIAGNOSIS — Z98.1 HISTORY OF LUMBAR SPINAL FUSION: ICD-10-CM

## 2024-04-10 DIAGNOSIS — Z98.1 HISTORY OF FUSION OF CERVICAL SPINE: Primary | ICD-10-CM

## 2024-04-10 PROCEDURE — 1126F AMNT PAIN NOTED NONE PRSNT: CPT | Mod: CPTII,S$GLB,, | Performed by: PHYSICIAN ASSISTANT

## 2024-04-10 PROCEDURE — 1101F PT FALLS ASSESS-DOCD LE1/YR: CPT | Mod: CPTII,S$GLB,, | Performed by: PHYSICIAN ASSISTANT

## 2024-04-10 PROCEDURE — 1160F RVW MEDS BY RX/DR IN RCRD: CPT | Mod: CPTII,S$GLB,, | Performed by: PHYSICIAN ASSISTANT

## 2024-04-10 PROCEDURE — 3077F SYST BP >= 140 MM HG: CPT | Mod: CPTII,S$GLB,, | Performed by: PHYSICIAN ASSISTANT

## 2024-04-10 PROCEDURE — 99214 OFFICE O/P EST MOD 30 MIN: CPT | Mod: S$GLB,,, | Performed by: PHYSICIAN ASSISTANT

## 2024-04-10 PROCEDURE — 3288F FALL RISK ASSESSMENT DOCD: CPT | Mod: CPTII,S$GLB,, | Performed by: PHYSICIAN ASSISTANT

## 2024-04-10 PROCEDURE — 3079F DIAST BP 80-89 MM HG: CPT | Mod: CPTII,S$GLB,, | Performed by: PHYSICIAN ASSISTANT

## 2024-04-10 PROCEDURE — 3008F BODY MASS INDEX DOCD: CPT | Mod: CPTII,S$GLB,, | Performed by: PHYSICIAN ASSISTANT

## 2024-04-10 PROCEDURE — 1159F MED LIST DOCD IN RCRD: CPT | Mod: CPTII,S$GLB,, | Performed by: PHYSICIAN ASSISTANT
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
## 2024-07-12 ENCOUNTER — PATIENT MESSAGE (OUTPATIENT)
Dept: NEUROSURGERY | Facility: CLINIC | Age: 69
End: 2024-07-12
Payer: MEDICARE

## (undated) DEVICE — GLOVE 7.5 PROTEXIS PI MICRO

## (undated) DEVICE — SOL SOD CHLORIDE 0.9% 10ML

## (undated) DEVICE — TRAY NERVE BLOCK

## (undated) DEVICE — TOWEL OR DISP STRL BLUE 4/PK

## (undated) DEVICE — APPLICATOR CHLORAPREP CLR 10.5

## (undated) DEVICE — NDL SPINAL SPINOCAN 22GX3.5